# Patient Record
Sex: MALE | Race: WHITE | NOT HISPANIC OR LATINO
[De-identification: names, ages, dates, MRNs, and addresses within clinical notes are randomized per-mention and may not be internally consistent; named-entity substitution may affect disease eponyms.]

---

## 2017-12-13 ENCOUNTER — APPOINTMENT (OUTPATIENT)
Dept: ORTHOPEDIC SURGERY | Facility: CLINIC | Age: 52
End: 2017-12-13
Payer: COMMERCIAL

## 2017-12-13 VITALS
SYSTOLIC BLOOD PRESSURE: 133 MMHG | WEIGHT: 192 LBS | HEART RATE: 79 BPM | DIASTOLIC BLOOD PRESSURE: 86 MMHG | BODY MASS INDEX: 30.13 KG/M2 | HEIGHT: 67 IN

## 2017-12-13 PROCEDURE — 20610 DRAIN/INJ JOINT/BURSA W/O US: CPT | Mod: LT

## 2017-12-13 PROCEDURE — 99214 OFFICE O/P EST MOD 30 MIN: CPT | Mod: 25

## 2017-12-13 PROCEDURE — 73564 X-RAY EXAM KNEE 4 OR MORE: CPT | Mod: LT

## 2017-12-13 RX ADMIN — LIDOCAINE HYDROCHLORIDE 3 %: 10 INJECTION, SOLUTION EPIDURAL; INFILTRATION; INTRACAUDAL; PERINEURAL at 00:00

## 2017-12-13 RX ADMIN — METHYLPREDNISOLONE ACETATE 2 MG/ML: 40 INJECTION, SUSPENSION INTRALESIONAL; INTRAMUSCULAR; INTRASYNOVIAL; SOFT TISSUE at 00:00

## 2017-12-19 RX ORDER — LIDOCAINE HYDROCHLORIDE 10 MG/ML
1 INJECTION, SOLUTION INFILTRATION; PERINEURAL
Refills: 0 | Status: COMPLETED | OUTPATIENT
Start: 2017-12-13

## 2017-12-19 RX ORDER — METHYLPRED ACET/NACL,ISO-OS/PF 40 MG/ML
40 VIAL (ML) INJECTION
Qty: 1 | Refills: 0 | Status: COMPLETED | OUTPATIENT
Start: 2017-12-13

## 2018-12-27 ENCOUNTER — APPOINTMENT (OUTPATIENT)
Dept: INTERNAL MEDICINE | Facility: CLINIC | Age: 53
End: 2018-12-27
Payer: COMMERCIAL

## 2018-12-27 ENCOUNTER — NON-APPOINTMENT (OUTPATIENT)
Age: 53
End: 2018-12-27

## 2018-12-27 ENCOUNTER — LABORATORY RESULT (OUTPATIENT)
Age: 53
End: 2018-12-27

## 2018-12-27 VITALS
HEIGHT: 66 IN | HEART RATE: 98 BPM | WEIGHT: 189 LBS | BODY MASS INDEX: 30.37 KG/M2 | DIASTOLIC BLOOD PRESSURE: 81 MMHG | SYSTOLIC BLOOD PRESSURE: 153 MMHG

## 2018-12-27 VITALS — SYSTOLIC BLOOD PRESSURE: 128 MMHG | DIASTOLIC BLOOD PRESSURE: 82 MMHG

## 2018-12-27 DIAGNOSIS — M50.20 OTHER CERVICAL DISC DISPLACEMENT, UNSPECIFIED CERVICAL REGION: ICD-10-CM

## 2018-12-27 DIAGNOSIS — Z80.1 FAMILY HISTORY OF MALIGNANT NEOPLASM OF TRACHEA, BRONCHUS AND LUNG: ICD-10-CM

## 2018-12-27 PROCEDURE — 99386 PREV VISIT NEW AGE 40-64: CPT | Mod: 25

## 2018-12-27 PROCEDURE — 93000 ELECTROCARDIOGRAM COMPLETE: CPT

## 2018-12-27 PROCEDURE — 81003 URINALYSIS AUTO W/O SCOPE: CPT | Mod: NC,QW

## 2018-12-27 PROCEDURE — 36415 COLL VENOUS BLD VENIPUNCTURE: CPT

## 2018-12-28 LAB
25(OH)D3 SERPL-MCNC: 27.4 NG/ML
ALBUMIN SERPL ELPH-MCNC: 4.6 G/DL
ALP BLD-CCNC: 45 U/L
ALT SERPL-CCNC: 24 U/L
ANION GAP SERPL CALC-SCNC: 12 MMOL/L
AST SERPL-CCNC: 23 U/L
BASOPHILS # BLD AUTO: 0.01 K/UL
BASOPHILS NFR BLD AUTO: 0.1 %
BILIRUB SERPL-MCNC: <0.2 MG/DL
BILIRUB UR QL STRIP: NORMAL
BUN SERPL-MCNC: 22 MG/DL
CALCIUM SERPL-MCNC: 9.9 MG/DL
CHLORIDE SERPL-SCNC: 107 MMOL/L
CHOLEST SERPL-MCNC: 226 MG/DL
CHOLEST/HDLC SERPL: 5.3 RATIO
CLARITY UR: CLEAR
CO2 SERPL-SCNC: 26 MMOL/L
COLLECTION METHOD: NORMAL
CREAT SERPL-MCNC: 0.94 MG/DL
EOSINOPHIL # BLD AUTO: 0.14 K/UL
EOSINOPHIL NFR BLD AUTO: 1.7 %
GLUCOSE SERPL-MCNC: 104 MG/DL
GLUCOSE UR-MCNC: NORMAL
HBA1C MFR BLD HPLC: 5.6 %
HCG UR QL: 0.2 EU/DL
HCT VFR BLD CALC: 44.9 %
HDLC SERPL-MCNC: 43 MG/DL
HGB BLD-MCNC: 14.3 G/DL
HGB UR QL STRIP.AUTO: NORMAL
IMM GRANULOCYTES NFR BLD AUTO: 0.2 %
KETONES UR-MCNC: NORMAL
LDLC SERPL CALC-MCNC: 138 MG/DL
LEUKOCYTE ESTERASE UR QL STRIP: NORMAL
LYMPHOCYTES # BLD AUTO: 3 K/UL
LYMPHOCYTES NFR BLD AUTO: 35.5 %
MAN DIFF?: NORMAL
MCHC RBC-ENTMCNC: 31.1 PG
MCHC RBC-ENTMCNC: 31.8 GM/DL
MCV RBC AUTO: 97.6 FL
MONOCYTES # BLD AUTO: 0.46 K/UL
MONOCYTES NFR BLD AUTO: 5.4 %
NEUTROPHILS # BLD AUTO: 4.82 K/UL
NEUTROPHILS NFR BLD AUTO: 57.1 %
NITRITE UR QL STRIP: NORMAL
PH UR STRIP: 5.5
PLATELET # BLD AUTO: 237 K/UL
POTASSIUM SERPL-SCNC: 5.4 MMOL/L
PROT SERPL-MCNC: 7.1 G/DL
PROT UR STRIP-MCNC: NORMAL
PSA SERPL-MCNC: 10.93 NG/ML
RBC # BLD: 4.6 M/UL
RBC # FLD: 13.6 %
SAVE SPECIMEN: NORMAL
SODIUM SERPL-SCNC: 145 MMOL/L
SP GR UR STRIP: 1.02
T3FREE SERPL-MCNC: 3.37 PG/ML
T4 SERPL-MCNC: 6.4 UG/DL
TRIGL SERPL-MCNC: 223 MG/DL
TSH SERPL-ACNC: 1.15 UIU/ML
URATE SERPL-MCNC: 5.5 MG/DL
WBC # FLD AUTO: 8.45 K/UL

## 2018-12-28 NOTE — PHYSICAL EXAM

## 2018-12-28 NOTE — ASSESSMENT
[FreeTextEntry1] : Hx of a cervical herniated disk s/p spinal fusion, no current deficits.  \par Question of paroxysmal atrial fibrillation.  Was witnessed just once and never again.PAtient has been on diltiazem ever since. EKG NSR now.  EIther the diltiazem is suppressing it or really has not happened. Recommended he return for cardiology follow up ask about a halter or event monitor or a loop monitor.  Continue diltiazem for now.  \par \par MIld BPH symptoms, patient has had a biopsy in the past for a PSA of 10, was found to be benign last year by biopsy.  He remains mildly sympatric and is on supplements, informed him that I doubt that the supplements would work to well but its harmless to give it a try.  HE is following with a urologist.  \par \par \par Check CBC, CMP, HgA1c, Lipid profile, TSH, Vitamin D, UA

## 2018-12-28 NOTE — HISTORY OF PRESENT ILLNESS
[de-identified] : This is a 53 year old Patient previously seeing Dr. Holman 15 year ago.  Dr. Bean previous PCP since then until now.  \par Patient has been diagnosed with Afib years ago during a procedure. It was witnessed once and never seen again 120mg of diltiazem and aspirin.  NO anticoagulation. There is some doubts that he really has it or not.  IT was never treated with anticoagulation or ablation and as far as he knows its never happened since then, though he does describe a funny heart beat for a few seconds once in a while.  Once or twice a year. \par \par He is on a Urinazinc for the prostate, does have 1-2 episodes of nocturia a night ,and some frequency in the day but not too bad, unclear if that’s helping. He was on superbetaprostate at one point but it was expensive and did not help.  \par Cardiologist Dr. Bowling at [facility].  \par \par PSA 10 had biopsy 3 yeares ago found no cancer.  \par Disc surgery\par Hip replacemet 2/12/12.  \par Appendectomy 2011.\par \par Using the patch can tolerate not smoking for a while.  \par \par Colonoscopy 2016 normal. \par \par Left knee arhtirtis for a long itme seeing Dr. Waite.  COnsidering knee replacement. Right knee does bother him.   AChes while driving.  \par

## 2019-04-25 ENCOUNTER — APPOINTMENT (OUTPATIENT)
Dept: INTERNAL MEDICINE | Facility: CLINIC | Age: 54
End: 2019-04-25
Payer: COMMERCIAL

## 2019-04-25 VITALS
SYSTOLIC BLOOD PRESSURE: 115 MMHG | HEART RATE: 75 BPM | WEIGHT: 178 LBS | HEIGHT: 66 IN | DIASTOLIC BLOOD PRESSURE: 70 MMHG | BODY MASS INDEX: 28.61 KG/M2

## 2019-04-25 DIAGNOSIS — M17.12 UNILATERAL PRIMARY OSTEOARTHRITIS, LEFT KNEE: ICD-10-CM

## 2019-04-25 LAB — SAVE SPECIMEN: NORMAL

## 2019-04-25 PROCEDURE — 36415 COLL VENOUS BLD VENIPUNCTURE: CPT

## 2019-04-25 PROCEDURE — 99406 BEHAV CHNG SMOKING 3-10 MIN: CPT

## 2019-04-25 PROCEDURE — 99213 OFFICE O/P EST LOW 20 MIN: CPT | Mod: 25

## 2019-04-25 NOTE — COUNSELING
[Weight management counseling provided] : Weight management [Healthy eating counseling provided] : healthy eating [Activity counseling provided] : activity [Tobacco Use Cessation Intermediate Greater Than 3 Minutes Up to 10 Minutes] : Tobacco Use Cessation Intermediate Greater Than 3 Minutes Up to 10 Minutes

## 2019-04-25 NOTE — PLAN
[FreeTextEntry1] : \par \par PAF on ASA 325mg/CCB, with intermittent palpitations and "skipped beats"\par -has appt with cardiologist in 3 weeks, \par -discussed possibility of Holter monitor vs loop recorder \par \par Left knee pain, 2/2 to underlying severe degenerative dz, \par -f/u orthopedics for possible replacement \par \par Overweight \par -Educated of the importance of Healthy diet, such as Mediterranean Diet and Exercise, such as walking >20 minutes a day and increasing gradually as tolerated\par -Educated on the importance of limiting alcohol use to less than 5 drinks per week and refraining from tobacco and drug use.\par \par Hyperlipidemia \par -Will check labs \par -Advised decrease greasy, fatty foods, increase exercise, fiber intake \par -Elevated cholesterol has been linked to increase in cardiovascular even such as heart attack, stroke, peripheral artery disease and death\par \par Nicotine Dependence\par -discussed multiple options to quit smoking such as nicotine replacements, medications and group therapy, advised to pick a quit date and strive to achieve complete cessation, informational phone numbers, programs and educational materiel given to patient, stressed importance to quit as to reduce risk of cancer, cardiovascular event and premature death.  \par -Patient is a smoker, smoking cessation was strongly encouraged.  Patient was advised that smoking cessation lowers risks for lung and other types of cancer, reduces the risk of coronary heart disease, stroke and peripheral vascular disease and reduces the risk of developing chronic obstructive pulmonary disease (COPD).  Overall, smoking can cause serious health issues and death.  \par -It is important that you stop smoking.  Advised to pick a quit date and adhere to therapy.  If you need help to quit smoking call the [facility] Smokers Quitline at 1-920.287.2426 for additional information and nicotine replacement therapy   time spent on education 5 minutes.\par \par \par follow-up in 6 months \par \par

## 2019-04-25 NOTE — HISTORY OF PRESENT ILLNESS
[de-identified] : 54 y/o male with h/o Paroxysmal afib on asa 325mg/ccb, osteoarthritis and hyperlipidemia presents for follow-up \par \par states overall is feeling well, has intentionally lost weight ~10lbs through diet.  \par \par h/o osteoarthritis, mainly in left knee causing pain daily.  better after ibuprofen in the am.  noted to have severe degenerative disease.  works as a AC  involving climbing stairs daily. s/p steroid injections without much benefit \par \par h/o PAF, states was seen after having his hip operation.  ecgs after then have been NSR.  sees cardiologist with [facility].  states occasionally feels skipped beats and palpitations, briefly that resolve on their own.  \par \par still smoking about 4-5 cigs AD, as compared to 1PPD.

## 2019-04-25 NOTE — PLAN
[FreeTextEntry1] : \par \par PAF on ASA 325mg/CCB, with intermittent palpitations and "skipped beats"\par -has appt with cardiologist in 3 weeks, \par -discussed possibility of Holter monitor vs loop recorder \par \par Left knee pain, 2/2 to underlying severe degenerative dz, \par -f/u orthopedics for possible replacement \par \par Overweight \par -Educated of the importance of Healthy diet, such as Mediterranean Diet and Exercise, such as walking >20 minutes a day and increasing gradually as tolerated\par -Educated on the importance of limiting alcohol use to less than 5 drinks per week and refraining from tobacco and drug use.\par \par Hyperlipidemia \par -Will check labs \par -Advised decrease greasy, fatty foods, increase exercise, fiber intake \par -Elevated cholesterol has been linked to increase in cardiovascular even such as heart attack, stroke, peripheral artery disease and death\par \par Nicotine Dependence\par -discussed multiple options to quit smoking such as nicotine replacements, medications and group therapy, advised to pick a quit date and strive to achieve complete cessation, informational phone numbers, programs and educational materiel given to patient, stressed importance to quit as to reduce risk of cancer, cardiovascular event and premature death.  \par -Patient is a smoker, smoking cessation was strongly encouraged.  Patient was advised that smoking cessation lowers risks for lung and other types of cancer, reduces the risk of coronary heart disease, stroke and peripheral vascular disease and reduces the risk of developing chronic obstructive pulmonary disease (COPD).  Overall, smoking can cause serious health issues and death.  \par -It is important that you stop smoking.  Advised to pick a quit date and adhere to therapy.  If you need help to quit smoking call the [facility] Smokers Quitline at 1-703.743.2452 for additional information and nicotine replacement therapy   time spent on education 5 minutes.\par \par \par follow-up in 6 months \par \par

## 2019-04-26 LAB
25(OH)D3 SERPL-MCNC: 36.3 NG/ML
ALBUMIN SERPL ELPH-MCNC: 4.5 G/DL
ALP BLD-CCNC: 43 U/L
ALT SERPL-CCNC: 18 U/L
ANION GAP SERPL CALC-SCNC: 13 MMOL/L
AST SERPL-CCNC: 22 U/L
BASOPHILS # BLD AUTO: 0.04 K/UL
BASOPHILS NFR BLD AUTO: 0.5 %
BILIRUB DIRECT SERPL-MCNC: 0.1 MG/DL
BILIRUB INDIRECT SERPL-MCNC: 0.2 MG/DL
BILIRUB SERPL-MCNC: 0.3 MG/DL
BUN SERPL-MCNC: 16 MG/DL
CALCIUM SERPL-MCNC: 9.9 MG/DL
CHLORIDE SERPL-SCNC: 104 MMOL/L
CHOLEST SERPL-MCNC: 195 MG/DL
CHOLEST/HDLC SERPL: 4.4 RATIO
CO2 SERPL-SCNC: 24 MMOL/L
CREAT SERPL-MCNC: 0.82 MG/DL
EOSINOPHIL # BLD AUTO: 0.13 K/UL
EOSINOPHIL NFR BLD AUTO: 1.8 %
ESTIMATED AVERAGE GLUCOSE: 111 MG/DL
FOLATE SERPL-MCNC: >20 NG/ML
GLUCOSE SERPL-MCNC: 94 MG/DL
HBA1C MFR BLD HPLC: 5.5 %
HCT VFR BLD CALC: 44.6 %
HDLC SERPL-MCNC: 44 MG/DL
HGB BLD-MCNC: 14.8 G/DL
IMM GRANULOCYTES NFR BLD AUTO: 0.3 %
LDLC SERPL CALC-MCNC: 135 MG/DL
LYMPHOCYTES # BLD AUTO: 2.74 K/UL
LYMPHOCYTES NFR BLD AUTO: 37.2 %
MAN DIFF?: NORMAL
MCHC RBC-ENTMCNC: 31.4 PG
MCHC RBC-ENTMCNC: 33.2 GM/DL
MCV RBC AUTO: 94.7 FL
MONOCYTES # BLD AUTO: 0.49 K/UL
MONOCYTES NFR BLD AUTO: 6.7 %
NEUTROPHILS # BLD AUTO: 3.94 K/UL
NEUTROPHILS NFR BLD AUTO: 53.5 %
PLATELET # BLD AUTO: 223 K/UL
POTASSIUM SERPL-SCNC: 5 MMOL/L
PROT SERPL-MCNC: 7.4 G/DL
RBC # BLD: 4.71 M/UL
RBC # FLD: 12.4 %
SODIUM SERPL-SCNC: 141 MMOL/L
T4 SERPL-MCNC: 6 UG/DL
TRIGL SERPL-MCNC: 80 MG/DL
TSH SERPL-ACNC: 0.88 UIU/ML
VIT B12 SERPL-MCNC: 756 PG/ML
WBC # FLD AUTO: 7.36 K/UL

## 2019-06-25 ENCOUNTER — APPOINTMENT (OUTPATIENT)
Dept: ORTHOPEDIC SURGERY | Facility: CLINIC | Age: 54
End: 2019-06-25
Payer: COMMERCIAL

## 2019-06-25 VITALS
WEIGHT: 178 LBS | HEART RATE: 73 BPM | BODY MASS INDEX: 27.94 KG/M2 | SYSTOLIC BLOOD PRESSURE: 107 MMHG | HEIGHT: 67 IN | DIASTOLIC BLOOD PRESSURE: 71 MMHG

## 2019-06-25 DIAGNOSIS — M17.0 BILATERAL PRIMARY OSTEOARTHRITIS OF KNEE: ICD-10-CM

## 2019-06-25 PROCEDURE — 73562 X-RAY EXAM OF KNEE 3: CPT | Mod: RT

## 2019-06-25 PROCEDURE — 72170 X-RAY EXAM OF PELVIS: CPT | Mod: 59

## 2019-06-25 PROCEDURE — 99214 OFFICE O/P EST MOD 30 MIN: CPT

## 2019-06-25 NOTE — HISTORY OF PRESENT ILLNESS
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 The left being worse than the right he saw Dr. Austin for a cortisone injection, which worked for a month or so. He is having increasing pain in his line of work, climbing ladders and jumping walls etc. his use nonsteroidals in the past.\par Pertinent medical history is the fact that he had a right total hip replacement done with a significant leg length difference the right leg being longer by about 7/8 of an inch. [de-identified] : minimal reli nonsteroidals

## 2019-06-25 NOTE — DISCUSSION/SUMMARY
[Surgical risks reviewed] : Surgical risks reviewed [Medication Risks Reviewed] : Medication risks reviewed [de-identified] : The patient was seen and evaluated today with Dr. Gil the patient has failed anti-inflammatory therapy. He is quite physically active and participates in gym exercises. He has had cortisone in the past at the present time, Dr. Gil, favors the patient trying any viscose supplementation. Trial to see if this can temporize his discomfort and failing that the patient would be recommended for a total knee replacement of the left knee.\par Dr. Gil evaluated this patient and will be guiding this patient's entire orthopedic management plan. The patient was advised that based upon their clinical presentation and radiographic findings they meet inclusion criteria for benefitting from a left total knee arthroplasty as a treatment option for severe arthritis of the knee.\par The spectrum of treatments for severe knee DJD were reviewed in detail. I reviewed in detail the goals, alternatives, risks and benefits of total knee arthroplasty. The patient was demonstrated. A model of the total knee replacement. Advised on the brand and preliminary model of the implant that I use. Patient also understands that the preliminary discharge plan would be for the patient to go home in approximately 3 days unless otherwise not cleared by physical therapy\par \par The patient was advised of the possible complications which are inclusive of but not exclusive to VTE-related, infectious-related, anesthetic-related, surgically-related, medically-related, and implant-related. \par The patient was advised that I operate as a surgical team with his associate Dr. CONCETTA Angulo. The patient agreed to the plan of care, as well as the use of implants for there. Total knee surgery\par The patient was advised that they will require a medical preoperative risk evaluation by their PCP. Further medical subspecialty clearances such as cardiac may be indicated if felt needed by their PCP.\par The patient was advised he/she may call our office after careful consideration of their treatment options and further consultation with any other physician, to begin the process of preoperative planning for elective left TKR at a time of their choosing. \par A long discussion was held with the patient in regards to the total joint replacement. Using a model to demonstrate the procedure. The risks, benefits, and alternatives to surgical intervention were discussed at length with the patient hospitalization and rehabilitation were discussed and the patient was made aware of the prophylaxis with antibiotics and the need for postoperative anticoagulation. Specific risks discussed included infection, wound breakdown, numbness, and damage to nerves, tendons, muscles, arteries and blood vessels. The possibility of recurrent pain, no improvement in pain and actual worsening of pain were also mentioned in conversation with the patient medical complications related to the patient's general medical health, including deep vein thrombosis, pulmonary embolism, heart attack, stroke, death, and other complications from anesthesia or we discussed as well. The patient was told that we will take steps to minimize these risks by using sterile technique antibiotics and DVT prophylaxis. When appropriate and to follow the patient in the clinical setting. Postoperatively to monitor her progress. The benefits of surgery were discussed and the patient was included in the conversation regarding potential for improvement of the current clinical condition. Through operative intervention. Alternatives to surgical intervention, including continued conservative management, which may yield less than optimal results in this particular patient, were discussed. All questions were answered to the satisfaction of the patient the patient will consider scheduling a joint replacement with our surgical booking desk, and we'll contact her office if there are any further questions that we may answer for this particular patient.\par

## 2019-06-25 NOTE — PHYSICAL EXAM
[LE] : 5/5 motor strength in bilateral lower extremities [Antalgic] : antalgic [DP] : dorsalis pedis 2+ and symmetric bilaterally [PT] : posterior tibial 2+ and symmetric bilaterally [Normal LLE] : Left Lower Extremity: No scars, rashes, lesions, ulcers, skin intact [Normal RLE] : Right Lower Extremity: No scars, rashes, lesions, ulcers, skin intact [Normal Touch] : sensation intact for touch [Normal] : Oriented to person, place, and time, insight and judgement were intact and the affect was normal [de-identified] : Slight antalgic gait, apparently, favoring the left knee. The patient is wearing 7/8 lift on his left lower extremity shoe to make up for the length and right hip.\par On examination, right hip shows some stiffness with only 20° of external rotation at 90° of flexion 20°, abduction of the right hip is also a similar stiff with limitation of 20° of external rotation 10° of internal rotation about 20-25° of abduction. Armando examination bilateral is positive at about 45°.\par Examining both knees. There is a prominent Baker's cyst behind the right knee grade 3/5  4/5 overpull of the left knee. Range of motion of both knees is from near full extension, flexing to 110°. Neurovascular status intact in lower extremities good strength against resistance at EHL and dorsiflexion. No extensor lag. No anterior drawer bilaterally [de-identified] : AP pelvis, shows a right total hip replacement in apparent good repair is significant leg length difference with the right leg being longer than the left measured across the lesser trochanter.\par 3 views of both knees show extensive DJD of the left knee with bone-on-bone wear along the medial joint line with the significant debris in the patellofemoral articulation and debris in the medial joint line of the left knee. The right knee also shows some significant wear of the medial joint line. Both tibias are beginning to sublux laterally under the femur. Grade 1

## 2019-07-31 ENCOUNTER — OUTPATIENT (OUTPATIENT)
Dept: OUTPATIENT SERVICES | Facility: HOSPITAL | Age: 54
LOS: 1 days | End: 2019-07-31
Payer: COMMERCIAL

## 2019-07-31 VITALS
SYSTOLIC BLOOD PRESSURE: 109 MMHG | DIASTOLIC BLOOD PRESSURE: 69 MMHG | WEIGHT: 180.78 LBS | RESPIRATION RATE: 16 BRPM | OXYGEN SATURATION: 96 % | HEART RATE: 75 BPM | TEMPERATURE: 99 F | HEIGHT: 67.25 IN

## 2019-07-31 DIAGNOSIS — M17.12 UNILATERAL PRIMARY OSTEOARTHRITIS, LEFT KNEE: ICD-10-CM

## 2019-07-31 DIAGNOSIS — Z96.60 PRESENCE OF UNSPECIFIED ORTHOPEDIC JOINT IMPLANT: Chronic | ICD-10-CM

## 2019-07-31 DIAGNOSIS — Z98.890 OTHER SPECIFIED POSTPROCEDURAL STATES: Chronic | ICD-10-CM

## 2019-07-31 DIAGNOSIS — Z86.79 PERSONAL HISTORY OF OTHER DISEASES OF THE CIRCULATORY SYSTEM: ICD-10-CM

## 2019-07-31 DIAGNOSIS — Z98.89 OTHER SPECIFIED POSTPROCEDURAL STATES: Chronic | ICD-10-CM

## 2019-07-31 DIAGNOSIS — Z01.818 ENCOUNTER FOR OTHER PREPROCEDURAL EXAMINATION: ICD-10-CM

## 2019-07-31 LAB
ALBUMIN SERPL ELPH-MCNC: 4 G/DL — SIGNIFICANT CHANGE UP (ref 3.3–5)
ALP SERPL-CCNC: 49 U/L — SIGNIFICANT CHANGE UP (ref 30–120)
ALT FLD-CCNC: 27 U/L DA — SIGNIFICANT CHANGE UP (ref 10–60)
ANION GAP SERPL CALC-SCNC: 5 MMOL/L — SIGNIFICANT CHANGE UP (ref 5–17)
APTT BLD: 30.9 SEC — SIGNIFICANT CHANGE UP (ref 28.5–37)
AST SERPL-CCNC: 18 U/L — SIGNIFICANT CHANGE UP (ref 10–40)
BILIRUB SERPL-MCNC: 0.3 MG/DL — SIGNIFICANT CHANGE UP (ref 0.2–1.2)
BLD GP AB SCN SERPL QL: SIGNIFICANT CHANGE UP
BUN SERPL-MCNC: 16 MG/DL — SIGNIFICANT CHANGE UP (ref 7–23)
CALCIUM SERPL-MCNC: 9.2 MG/DL — SIGNIFICANT CHANGE UP (ref 8.4–10.5)
CHLORIDE SERPL-SCNC: 106 MMOL/L — SIGNIFICANT CHANGE UP (ref 96–108)
CO2 SERPL-SCNC: 29 MMOL/L — SIGNIFICANT CHANGE UP (ref 22–31)
CREAT SERPL-MCNC: 0.99 MG/DL — SIGNIFICANT CHANGE UP (ref 0.5–1.3)
GLUCOSE SERPL-MCNC: 95 MG/DL — SIGNIFICANT CHANGE UP (ref 70–99)
HCT VFR BLD CALC: 44.8 % — SIGNIFICANT CHANGE UP (ref 39–50)
HGB BLD-MCNC: 15.1 G/DL — SIGNIFICANT CHANGE UP (ref 13–17)
INR BLD: 1.02 RATIO — SIGNIFICANT CHANGE UP (ref 0.88–1.16)
MCHC RBC-ENTMCNC: 32.2 PG — SIGNIFICANT CHANGE UP (ref 27–34)
MCHC RBC-ENTMCNC: 33.7 GM/DL — SIGNIFICANT CHANGE UP (ref 32–36)
MCV RBC AUTO: 95.5 FL — SIGNIFICANT CHANGE UP (ref 80–100)
MRSA PCR RESULT.: SIGNIFICANT CHANGE UP
NRBC # BLD: 0 /100 WBCS — SIGNIFICANT CHANGE UP (ref 0–0)
PLATELET # BLD AUTO: 197 K/UL — SIGNIFICANT CHANGE UP (ref 150–400)
POTASSIUM SERPL-MCNC: 5.1 MMOL/L — SIGNIFICANT CHANGE UP (ref 3.5–5.3)
POTASSIUM SERPL-SCNC: 5.1 MMOL/L — SIGNIFICANT CHANGE UP (ref 3.5–5.3)
PROT SERPL-MCNC: 7.4 G/DL — SIGNIFICANT CHANGE UP (ref 6–8.3)
PROTHROM AB SERPL-ACNC: 11.1 SEC — SIGNIFICANT CHANGE UP (ref 10–12.9)
RBC # BLD: 4.69 M/UL — SIGNIFICANT CHANGE UP (ref 4.2–5.8)
RBC # FLD: 12.5 % — SIGNIFICANT CHANGE UP (ref 10.3–14.5)
S AUREUS DNA NOSE QL NAA+PROBE: DETECTED
SODIUM SERPL-SCNC: 140 MMOL/L — SIGNIFICANT CHANGE UP (ref 135–145)
WBC # BLD: 6.53 K/UL — SIGNIFICANT CHANGE UP (ref 3.8–10.5)
WBC # FLD AUTO: 6.53 K/UL — SIGNIFICANT CHANGE UP (ref 3.8–10.5)

## 2019-07-31 PROCEDURE — 85027 COMPLETE CBC AUTOMATED: CPT

## 2019-07-31 PROCEDURE — 87641 MR-STAPH DNA AMP PROBE: CPT

## 2019-07-31 PROCEDURE — 85610 PROTHROMBIN TIME: CPT

## 2019-07-31 PROCEDURE — 80053 COMPREHEN METABOLIC PANEL: CPT

## 2019-07-31 PROCEDURE — 93005 ELECTROCARDIOGRAM TRACING: CPT

## 2019-07-31 PROCEDURE — 85730 THROMBOPLASTIN TIME PARTIAL: CPT

## 2019-07-31 PROCEDURE — 86850 RBC ANTIBODY SCREEN: CPT

## 2019-07-31 PROCEDURE — 86901 BLOOD TYPING SEROLOGIC RH(D): CPT

## 2019-07-31 PROCEDURE — 86900 BLOOD TYPING SEROLOGIC ABO: CPT

## 2019-07-31 PROCEDURE — 93010 ELECTROCARDIOGRAM REPORT: CPT

## 2019-07-31 PROCEDURE — 87640 STAPH A DNA AMP PROBE: CPT

## 2019-07-31 PROCEDURE — G0463: CPT

## 2019-07-31 PROCEDURE — 36415 COLL VENOUS BLD VENIPUNCTURE: CPT

## 2019-07-31 NOTE — H&P PST ADULT - HISTORY OF PRESENT ILLNESS
54 yo male presents with 2 year history of left knee pain. Received cortisone injections in the past with minimal relief. Pain currently 0-2/10 at rest and 5/10 with daily activity and 8-9/10 with prolonged activity. Pain relieved with ibuprofen.

## 2019-07-31 NOTE — H&P PST ADULT - NSICDXPASTMEDICALHX_GEN_ALL_CORE_FT
PAST MEDICAL HISTORY:  Atrial fibrillation 2012, after THR and no recurrence since    BPH (benign prostatic hyperplasia)     Cervical herniated disc     Constipation     Essential hypertension     History of osteoarthritis     Lumbar pain     Nocturia x 2-3    Palpitations on occasion    Primary osteoarthritis of left knee     Snoring wears an oral appliance which improved snoring, no witnessed apneas    Spinal arthritis     Urinary hesitancy     Varicose veins left leg including left knee

## 2019-07-31 NOTE — H&P PST ADULT - NSICDXPROBLEM_GEN_ALL_CORE_FT
PROBLEM DIAGNOSES  Problem: Primary osteoarthritis of left knee  Assessment and Plan: left TKR. medical clearance requested. instructed to check with PCP about stopping aspirin or changing to low dose. stop urinozinc and ibuprofen 1 week prior to surgery. surgical wash instructions reviewed and verbalized understanding PROBLEM DIAGNOSES  Problem: History of varicose veins  Assessment and Plan: Called Concha Walton and notified her of large varicose veins on left knee and distal to the knee. Patient to have vascular consultation with Dr. Reynolds.    Problem: Primary osteoarthritis of left knee  Assessment and Plan: left TKR. medical clearance requested. instructed to check with PCP about stopping aspirin or changing to low dose. stop urinozinc and ibuprofen 1 week prior to surgery. surgical wash instructions reviewed and verbalized understanding

## 2019-07-31 NOTE — H&P PST ADULT - NSICDXFAMILYHX_GEN_ALL_CORE_FT
FAMILY HISTORY:  Father  Still living? No  FHx: lung cancer, Age at diagnosis: Age Unknown    Sibling  Still living? Yes, Estimated age: 51-60  Family history of osteoarthritis, Age at diagnosis: Age Unknown    Grandparent  Still living? No  Family history of osteoarthritis, Age at diagnosis: Age Unknown

## 2019-07-31 NOTE — H&P PST ADULT - NSANTHOSAYNRD_GEN_A_CORE
No. MUNA screening performed.  STOP BANG Legend: 0-2 = LOW Risk; 3-4 = INTERMEDIATE Risk; 5-8 = HIGH Risk/neck neck 15 inches/No. MUNA screening performed.  STOP BANG Legend: 0-2 = LOW Risk; 3-4 = INTERMEDIATE Risk; 5-8 = HIGH Risk

## 2019-07-31 NOTE — H&P PST ADULT - RS GEN PE MLT RESP DETAILS PC
clear to auscultation bilaterally/good air movement/breath sounds equal/no rales/no wheezes/airway patent/respirations non-labored/no rhonchi

## 2019-07-31 NOTE — H&P PST ADULT - NSICDXPASTSURGICALHX_GEN_ALL_CORE_FT
PAST SURGICAL HISTORY:  H/O cervical spine surgery cervical laminectomy 2012, unsure levels    S/P appendectomy 2009    S/P hip replacement right 2013

## 2019-07-31 NOTE — H&P PST ADULT - MUSCULOSKELETAL
details… detailed exam no joint warmth/decreased ROM/no calf tenderness/no joint erythema/diminished strength/left knee/decreased ROM due to pain/no joint swelling

## 2019-08-01 RX ORDER — MUPIROCIN 20 MG/G
1 OINTMENT TOPICAL
Qty: 1 | Refills: 0
Start: 2019-08-01 | End: 2019-08-05

## 2019-08-01 NOTE — PROGRESS NOTE ADULT - SUBJECTIVE AND OBJECTIVE BOX
Nasal culture + for MSSA.  Rx for Mupirocin e-scribed to pharmacy.  Message left on answering machine requesting a call back.  SS Nasal culture + for MSSA.  Rx for Mupirocin e-scribed to pharmacy.  Message left on answering machine requesting a call back.  SS    8/2-  Spoke with patient.  Use of Mupirocin reviewed with patient and understood. SS

## 2019-08-05 ENCOUNTER — APPOINTMENT (OUTPATIENT)
Dept: INTERNAL MEDICINE | Facility: CLINIC | Age: 54
End: 2019-08-05
Payer: COMMERCIAL

## 2019-08-05 VITALS
DIASTOLIC BLOOD PRESSURE: 74 MMHG | RESPIRATION RATE: 16 BRPM | SYSTOLIC BLOOD PRESSURE: 118 MMHG | HEIGHT: 67 IN | BODY MASS INDEX: 28.56 KG/M2 | OXYGEN SATURATION: 98 % | WEIGHT: 182 LBS | HEART RATE: 76 BPM

## 2019-08-05 PROCEDURE — 99214 OFFICE O/P EST MOD 30 MIN: CPT

## 2019-08-06 NOTE — ASSESSMENT
[High Risk Surgery - Intraperitoneal, Intrathoracic or Supringuinal Vascular Procedures] : High Risk Surgery - Intraperitoneal, Intrathoracic or Supringuinal Vascular Procedures - No (0) [Ischemic Heart Disease] : Ischemic Heart Disease - No (0) [Congestive Heart Failure] : Congestive Heart Failure - No (0) [Creatinine >= 2mg/dL (1 Point)] : Creatinine >= 2mg/dL - No (0) [Prior Cerebrovascular Accident or TIA] : Prior Cerebrovascular Accident or TIA - No (0) [Insulin-dependent Diabetic (1 Point)] : Insulin-dependent Diabetic - No (0) [0] : 0 , RCRI Class: I, Risk of Post-Op Cardiac Complications: 0.4%, Procedure Risk: Low-Risk [No Further Testing Recommended] : no further testing recommended [Patient Optimized for Surgery] : Patient optimized for surgery [Modify anti-platelet treatment prior to procedure] : Modify anti-platelet treatment prior to procedure [As per surgery] : as per surgery [FreeTextEntry4] : 54 y/o male smoker with h/o PAF on asa 325mg, last episode on ecg was several years ago.  \par patient is a moderate risk candidate undergoing moderate risk procedure.\par \par patient is optimized and cleared for surgery  [FreeTextEntry6] : stop 1 week prior to surgery

## 2019-08-06 NOTE — HISTORY OF PRESENT ILLNESS
[Smoker] : smoker [(Patient denies any chest pain, claudication, dyspnea on exertion, orthopnea, palpitations or syncope)] : Patient denies any chest pain, claudication, dyspnea on exertion, orthopnea, palpitations or syncope [Chronic Anticoagulation] : no chronic anticoagulation [Diabetes] : no diabetes [Chronic Kidney Disease] : no chronic kidney disease [Good (7-10 METs)] : Good (7-10 METs) [FreeTextEntry1] : Left Knee Replacement  [FreeTextEntry2] : 08/19/2019 [FreeTextEntry3] : Dr. Luis Carlos Gil [FreeTextEntry4] : went to Pre-surgical testing last week. labs and ecg reviewed \par +smoker, 3-4x cigs\par

## 2019-08-19 ENCOUNTER — RESULT REVIEW (OUTPATIENT)
Age: 54
End: 2019-08-19

## 2019-08-19 ENCOUNTER — INPATIENT (INPATIENT)
Facility: HOSPITAL | Age: 54
LOS: 1 days | Discharge: HOME | DRG: 470 | End: 2019-08-21
Attending: ORTHOPAEDIC SURGERY | Admitting: ORTHOPAEDIC SURGERY
Payer: COMMERCIAL

## 2019-08-19 ENCOUNTER — APPOINTMENT (OUTPATIENT)
Dept: ORTHOPEDIC SURGERY | Facility: HOSPITAL | Age: 54
End: 2019-08-19

## 2019-08-19 VITALS
OXYGEN SATURATION: 98 % | TEMPERATURE: 98 F | HEART RATE: 76 BPM | DIASTOLIC BLOOD PRESSURE: 73 MMHG | HEIGHT: 67 IN | WEIGHT: 178.79 LBS | SYSTOLIC BLOOD PRESSURE: 119 MMHG | RESPIRATION RATE: 15 BRPM

## 2019-08-19 DIAGNOSIS — G47.33 OBSTRUCTIVE SLEEP APNEA (ADULT) (PEDIATRIC): ICD-10-CM

## 2019-08-19 DIAGNOSIS — M17.12 UNILATERAL PRIMARY OSTEOARTHRITIS, LEFT KNEE: ICD-10-CM

## 2019-08-19 DIAGNOSIS — Z98.890 OTHER SPECIFIED POSTPROCEDURAL STATES: Chronic | ICD-10-CM

## 2019-08-19 DIAGNOSIS — Z72.0 TOBACCO USE: ICD-10-CM

## 2019-08-19 DIAGNOSIS — I10 ESSENTIAL (PRIMARY) HYPERTENSION: ICD-10-CM

## 2019-08-19 DIAGNOSIS — Z96.652 PRESENCE OF LEFT ARTIFICIAL KNEE JOINT: ICD-10-CM

## 2019-08-19 DIAGNOSIS — F17.200 NICOTINE DEPENDENCE, UNSPECIFIED, UNCOMPLICATED: ICD-10-CM

## 2019-08-19 DIAGNOSIS — Z98.89 OTHER SPECIFIED POSTPROCEDURAL STATES: Chronic | ICD-10-CM

## 2019-08-19 DIAGNOSIS — Z96.60 PRESENCE OF UNSPECIFIED ORTHOPEDIC JOINT IMPLANT: Chronic | ICD-10-CM

## 2019-08-19 DIAGNOSIS — I48.0 PAROXYSMAL ATRIAL FIBRILLATION: ICD-10-CM

## 2019-08-19 PROBLEM — N40.0 BENIGN PROSTATIC HYPERPLASIA WITHOUT LOWER URINARY TRACT SYMPTOMS: Chronic | Status: ACTIVE | Noted: 2019-07-29

## 2019-08-19 PROBLEM — R39.11 HESITANCY OF MICTURITION: Chronic | Status: ACTIVE | Noted: 2019-07-29

## 2019-08-19 PROBLEM — I83.90 ASYMPTOMATIC VARICOSE VEINS OF UNSPECIFIED LOWER EXTREMITY: Chronic | Status: ACTIVE | Noted: 2019-07-31

## 2019-08-19 PROBLEM — I48.91 UNSPECIFIED ATRIAL FIBRILLATION: Chronic | Status: ACTIVE | Noted: 2019-07-29

## 2019-08-19 PROBLEM — R35.1 NOCTURIA: Chronic | Status: ACTIVE | Noted: 2019-07-29

## 2019-08-19 PROBLEM — R00.2 PALPITATIONS: Chronic | Status: ACTIVE | Noted: 2019-07-29

## 2019-08-19 PROBLEM — K59.00 CONSTIPATION, UNSPECIFIED: Chronic | Status: ACTIVE | Noted: 2019-07-31

## 2019-08-19 PROBLEM — R06.83 SNORING: Chronic | Status: ACTIVE | Noted: 2019-07-29

## 2019-08-19 LAB
ANION GAP SERPL CALC-SCNC: 7 MMOL/L — SIGNIFICANT CHANGE UP (ref 5–17)
BUN SERPL-MCNC: 14 MG/DL — SIGNIFICANT CHANGE UP (ref 7–23)
CALCIUM SERPL-MCNC: 8.3 MG/DL — LOW (ref 8.4–10.5)
CHLORIDE SERPL-SCNC: 104 MMOL/L — SIGNIFICANT CHANGE UP (ref 96–108)
CO2 SERPL-SCNC: 26 MMOL/L — SIGNIFICANT CHANGE UP (ref 22–31)
CREAT SERPL-MCNC: 0.98 MG/DL — SIGNIFICANT CHANGE UP (ref 0.5–1.3)
GLUCOSE SERPL-MCNC: 162 MG/DL — HIGH (ref 70–99)
HCT VFR BLD CALC: 40.2 % — SIGNIFICANT CHANGE UP (ref 39–50)
HGB BLD-MCNC: 13.4 G/DL — SIGNIFICANT CHANGE UP (ref 13–17)
POTASSIUM SERPL-MCNC: 4.3 MMOL/L — SIGNIFICANT CHANGE UP (ref 3.5–5.3)
POTASSIUM SERPL-SCNC: 4.3 MMOL/L — SIGNIFICANT CHANGE UP (ref 3.5–5.3)
SODIUM SERPL-SCNC: 137 MMOL/L — SIGNIFICANT CHANGE UP (ref 135–145)

## 2019-08-19 PROCEDURE — 73562 X-RAY EXAM OF KNEE 3: CPT | Mod: 26,LT

## 2019-08-19 PROCEDURE — 99406 BEHAV CHNG SMOKING 3-10 MIN: CPT

## 2019-08-19 PROCEDURE — 27447 TOTAL KNEE ARTHROPLASTY: CPT | Mod: 82,LT

## 2019-08-19 PROCEDURE — 27447 TOTAL KNEE ARTHROPLASTY: CPT | Mod: LT

## 2019-08-19 PROCEDURE — 99223 1ST HOSP IP/OBS HIGH 75: CPT | Mod: 25

## 2019-08-19 PROCEDURE — 88311 DECALCIFY TISSUE: CPT | Mod: 26

## 2019-08-19 PROCEDURE — 88305 TISSUE EXAM BY PATHOLOGIST: CPT | Mod: 26

## 2019-08-19 RX ORDER — ONDANSETRON 8 MG/1
4 TABLET, FILM COATED ORAL EVERY 6 HOURS
Refills: 0 | Status: DISCONTINUED | OUTPATIENT
Start: 2019-08-19 | End: 2019-08-21

## 2019-08-19 RX ORDER — HYDROMORPHONE HYDROCHLORIDE 2 MG/ML
0.5 INJECTION INTRAMUSCULAR; INTRAVENOUS; SUBCUTANEOUS
Refills: 0 | Status: DISCONTINUED | OUTPATIENT
Start: 2019-08-19 | End: 2019-08-19

## 2019-08-19 RX ORDER — CEFAZOLIN SODIUM 1 G
2000 VIAL (EA) INJECTION ONCE
Refills: 0 | Status: COMPLETED | OUTPATIENT
Start: 2019-08-19 | End: 2019-08-19

## 2019-08-19 RX ORDER — CHLORHEXIDINE GLUCONATE 213 G/1000ML
1 SOLUTION TOPICAL ONCE
Refills: 0 | Status: COMPLETED | OUTPATIENT
Start: 2019-08-19 | End: 2019-08-19

## 2019-08-19 RX ORDER — ACETAMINOPHEN 500 MG
1000 TABLET ORAL EVERY 6 HOURS
Refills: 0 | Status: COMPLETED | OUTPATIENT
Start: 2019-08-19 | End: 2019-08-20

## 2019-08-19 RX ORDER — APREPITANT 80 MG/1
40 CAPSULE ORAL ONCE
Refills: 0 | Status: COMPLETED | OUTPATIENT
Start: 2019-08-19 | End: 2019-08-19

## 2019-08-19 RX ORDER — POLYETHYLENE GLYCOL 3350 17 G/17G
17 POWDER, FOR SOLUTION ORAL DAILY
Refills: 0 | Status: DISCONTINUED | OUTPATIENT
Start: 2019-08-19 | End: 2019-08-21

## 2019-08-19 RX ORDER — OXYCODONE HYDROCHLORIDE 5 MG/1
10 TABLET ORAL
Refills: 0 | Status: DISCONTINUED | OUTPATIENT
Start: 2019-08-19 | End: 2019-08-21

## 2019-08-19 RX ORDER — SENNA PLUS 8.6 MG/1
2 TABLET ORAL AT BEDTIME
Refills: 0 | Status: DISCONTINUED | OUTPATIENT
Start: 2019-08-19 | End: 2019-08-21

## 2019-08-19 RX ORDER — HYDROMORPHONE HYDROCHLORIDE 2 MG/ML
0.5 INJECTION INTRAMUSCULAR; INTRAVENOUS; SUBCUTANEOUS
Refills: 0 | Status: DISCONTINUED | OUTPATIENT
Start: 2019-08-19 | End: 2019-08-21

## 2019-08-19 RX ORDER — OXYCODONE HYDROCHLORIDE 5 MG/1
5 TABLET ORAL
Refills: 0 | Status: DISCONTINUED | OUTPATIENT
Start: 2019-08-19 | End: 2019-08-21

## 2019-08-19 RX ORDER — MAGNESIUM HYDROXIDE 400 MG/1
30 TABLET, CHEWABLE ORAL DAILY
Refills: 0 | Status: DISCONTINUED | OUTPATIENT
Start: 2019-08-19 | End: 2019-08-21

## 2019-08-19 RX ORDER — POLYETHYLENE GLYCOL 3350 17 G/17G
1 POWDER, FOR SOLUTION ORAL
Qty: 0 | Refills: 0 | DISCHARGE

## 2019-08-19 RX ORDER — NICOTINE POLACRILEX 2 MG
1 GUM BUCCAL DAILY
Refills: 0 | Status: DISCONTINUED | OUTPATIENT
Start: 2019-08-19 | End: 2019-08-19

## 2019-08-19 RX ORDER — SODIUM CHLORIDE 9 MG/ML
1000 INJECTION, SOLUTION INTRAVENOUS
Refills: 0 | Status: DISCONTINUED | OUTPATIENT
Start: 2019-08-19 | End: 2019-08-19

## 2019-08-19 RX ORDER — NICOTINE POLACRILEX 2 MG
1 GUM BUCCAL DAILY
Refills: 0 | Status: DISCONTINUED | OUTPATIENT
Start: 2019-08-19 | End: 2019-08-21

## 2019-08-19 RX ORDER — DILTIAZEM HCL 120 MG
120 CAPSULE, EXT RELEASE 24 HR ORAL EVERY 24 HOURS
Refills: 0 | Status: DISCONTINUED | OUTPATIENT
Start: 2019-08-19 | End: 2019-08-21

## 2019-08-19 RX ORDER — TRANEXAMIC ACID 100 MG/ML
1000 INJECTION, SOLUTION INTRAVENOUS ONCE
Refills: 0 | Status: COMPLETED | OUTPATIENT
Start: 2019-08-19 | End: 2019-08-19

## 2019-08-19 RX ORDER — ASPIRIN/CALCIUM CARB/MAGNESIUM 324 MG
81 TABLET ORAL
Refills: 0 | Status: DISCONTINUED | OUTPATIENT
Start: 2019-08-20 | End: 2019-08-20

## 2019-08-19 RX ORDER — CEFAZOLIN SODIUM 1 G
2000 VIAL (EA) INJECTION EVERY 8 HOURS
Refills: 0 | Status: COMPLETED | OUTPATIENT
Start: 2019-08-19 | End: 2019-08-19

## 2019-08-19 RX ORDER — ACETAMINOPHEN 500 MG
1000 TABLET ORAL EVERY 8 HOURS
Refills: 0 | Status: DISCONTINUED | OUTPATIENT
Start: 2019-08-20 | End: 2019-08-21

## 2019-08-19 RX ORDER — ONDANSETRON 8 MG/1
4 TABLET, FILM COATED ORAL ONCE
Refills: 0 | Status: DISCONTINUED | OUTPATIENT
Start: 2019-08-19 | End: 2019-08-19

## 2019-08-19 RX ORDER — NICOTINE POLACRILEX 2 MG
1 GUM BUCCAL
Refills: 0 | Status: DISCONTINUED | OUTPATIENT
Start: 2019-08-19 | End: 2019-08-21

## 2019-08-19 RX ORDER — DOCUSATE SODIUM 100 MG
100 CAPSULE ORAL THREE TIMES A DAY
Refills: 0 | Status: DISCONTINUED | OUTPATIENT
Start: 2019-08-19 | End: 2019-08-21

## 2019-08-19 RX ORDER — SODIUM CHLORIDE 9 MG/ML
1000 INJECTION, SOLUTION INTRAVENOUS
Refills: 0 | Status: DISCONTINUED | OUTPATIENT
Start: 2019-08-19 | End: 2019-08-21

## 2019-08-19 RX ORDER — CELECOXIB 200 MG/1
200 CAPSULE ORAL EVERY 12 HOURS
Refills: 0 | Status: DISCONTINUED | OUTPATIENT
Start: 2019-08-19 | End: 2019-08-21

## 2019-08-19 RX ORDER — ACETAMINOPHEN 500 MG
1000 TABLET ORAL ONCE
Refills: 0 | Status: COMPLETED | OUTPATIENT
Start: 2019-08-19 | End: 2019-08-19

## 2019-08-19 RX ADMIN — OXYCODONE HYDROCHLORIDE 10 MILLIGRAM(S): 5 TABLET ORAL at 14:00

## 2019-08-19 RX ADMIN — SODIUM CHLORIDE 100 MILLILITER(S): 9 INJECTION, SOLUTION INTRAVENOUS at 14:00

## 2019-08-19 RX ADMIN — APREPITANT 40 MILLIGRAM(S): 80 CAPSULE ORAL at 06:18

## 2019-08-19 RX ADMIN — CELECOXIB 200 MILLIGRAM(S): 200 CAPSULE ORAL at 22:09

## 2019-08-19 RX ADMIN — Medication 1000 MILLIGRAM(S): at 22:09

## 2019-08-19 RX ADMIN — CHLORHEXIDINE GLUCONATE 1 APPLICATION(S): 213 SOLUTION TOPICAL at 06:18

## 2019-08-19 RX ADMIN — Medication 100 MILLIGRAM(S): at 16:19

## 2019-08-19 RX ADMIN — Medication 1000 MILLIGRAM(S): at 16:00

## 2019-08-19 RX ADMIN — OXYCODONE HYDROCHLORIDE 10 MILLIGRAM(S): 5 TABLET ORAL at 22:29

## 2019-08-19 RX ADMIN — SODIUM CHLORIDE 100 MILLILITER(S): 9 INJECTION, SOLUTION INTRAVENOUS at 21:58

## 2019-08-19 RX ADMIN — OXYCODONE HYDROCHLORIDE 10 MILLIGRAM(S): 5 TABLET ORAL at 15:00

## 2019-08-19 RX ADMIN — Medication 400 MILLIGRAM(S): at 21:59

## 2019-08-19 RX ADMIN — CELECOXIB 200 MILLIGRAM(S): 200 CAPSULE ORAL at 21:59

## 2019-08-19 RX ADMIN — Medication 100 MILLIGRAM(S): at 23:19

## 2019-08-19 RX ADMIN — OXYCODONE HYDROCHLORIDE 10 MILLIGRAM(S): 5 TABLET ORAL at 21:59

## 2019-08-19 RX ADMIN — Medication 120 MILLIGRAM(S): at 21:59

## 2019-08-19 RX ADMIN — Medication 400 MILLIGRAM(S): at 15:30

## 2019-08-19 NOTE — PHYSICAL THERAPY INITIAL EVALUATION ADULT - CRITERIA FOR SKILLED THERAPEUTIC INTERVENTIONS
anticipated discharge recommendation/impairments found/functional limitations in following categories/anticipated equipment needs at discharge

## 2019-08-19 NOTE — CONSULT NOTE ADULT - PROBLEM SELECTOR RECOMMENDATION 9
Pain Management: acceptable- continue current care Tylenol ATC/Celebrex ATC/ Oxycodone PRN  Continue PT/OT  DVT proph: [x  ] low risk - Aspirin   DC plan:  [ x ] Home with HC

## 2019-08-19 NOTE — PHYSICAL THERAPY INITIAL EVALUATION ADULT - ADDITIONAL COMMENTS
Pt plans to stay at his mother's house after discharge.  His mother's house has 1+1 steps to enter with rail.  No steps inside.  Pt has no DME.  Pt reports right LE is longer than left LE.

## 2019-08-19 NOTE — OCCUPATIONAL THERAPY INITIAL EVALUATION ADULT - ADDITIONAL COMMENTS
3-4 JR +railing +flight  with railing
Pt lives with family in a private home with a lot of stairs.    Pt will stay at his mother's home with 2 JR +railing no steps inside the home +tub

## 2019-08-19 NOTE — CONSULT NOTE ADULT - SUBJECTIVE AND OBJECTIVE BOX
Patient is a 53y old  Male who presents with a chief complaint of left knee pain.    HPI: 53M presented with 2 year history of left knee pain. Received cortisone injections in the past with minimal relief. Pain was 0-2/10 at rest and 5/10 with daily activity and 8-9/10 with prolonged activity. Pain was relieved with ibuprofen.  He is now s/p left TKR.  He is feeling well and has no new complaints.   Of note - anesthesia noticed the patient had apneic episodes in the OR.  When discussed with his wife she confirmed that even though he uses a mouthpiece to prevent snoring at night he still gasps for air while he sleeps.      REVIEW OF SYSTEMS:  CONSTITUTIONAL: No fever, weight loss, or fatigue  EYES: No eye pain, visual disturbances, or discharge  ENMT:  No difficulty hearing, tinnitus, vertigo; No sinus or throat pain  NECK: No pain or stiffness  BREASTS: No pain, masses, or nipple discharge  RESPIRATORY: No cough, wheezing, chills or hemoptysis; No shortness of breath  CARDIOVASCULAR: No chest pain, palpitations, dizziness, or leg swelling  GASTROINTESTINAL: No abdominal or epigastric pain. No nausea, vomiting, or hematemesis; No diarrhea or constipation. No melena or hematochezia.  GENITOURINARY: No dysuria, frequency, hematuria, or incontinence  NEUROLOGICAL: No headaches, memory loss, loss of strength, numbness, or tremors  SKIN: No itching, burning, rashes, or lesions   LYMPH NODES: No enlarged glands  ENDOCRINE: No heat or cold intolerance; No hair loss  MUSCULOSKELETAL: No muscle or back pain  PSYCHIATRIC: No depression, anxiety, mood swings, or difficulty sleeping  HEME/LYMPH: No easy bruising, or bleeding gums  ALLERGY AND IMMUNOLOGIC: No hives or eczema    PAST MEDICAL & SURGICAL HISTORY:  Varicose veins: left leg including left knee  Constipation  Primary osteoarthritis of left knee  Nocturia: x 2-3  Urinary hesitancy  BPH (benign prostatic hyperplasia)  Snoring: wears an oral appliance which improved snoring  Palpitations: on occasion  Atrial fibrillation: 2012, after THR and no recurrence since  History of osteoarthritis  Lumbar pain  Cervical herniated disc  Spinal arthritis  Essential hypertension  H/O cervical spine surgery: cervical laminectomy 2012, unsure levels  S/P appendectomy: 2009  S/P hip replacement: right 2013      SOCIAL HISTORY:  Residence: [ ] senior living  [ ] SNF  [x ] Community - with wife and daughter  [ ] Substance abuse: denies  [ ] Tobacco: used to smoke 1 PPD, now down to 4 cig per day but using patch  [ ] Alcohol use: occaisional    Allergies  No Known Allergies    MEDICATIONS  (STANDING):  diltiazem    milliGRAM(s) Oral every 24 hours  lactated ringers. 1000 milliLiter(s) (50 mL/Hr) IV Continuous <Continuous>  nicotine -  14 mG/24Hr(s) Patch 1 patch Transdermal daily    MEDICATIONS  (PRN):  HYDROmorphone  Injectable 0.5 milliGRAM(s) IV Push every 10 minutes PRN Moderate Pain (4 - 6)  nicotine - Inhaler 1 Each Inhalation every 3 hours PRN nicotine withdrawal symptoms  ondansetron Injectable 4 milliGRAM(s) IV Push once PRN Nausea and/or Vomiting      FAMILY HISTORY:  Family history of osteoarthritis (Sibling, Grandparent)  FHx: lung cancer (Father)      Vital Signs Last 24 Hrs  T(C): 36.5 (19 Aug 2019 09:56), Max: 36.8 (19 Aug 2019 06:11)  T(F): 97.7 (19 Aug 2019 09:56), Max: 98.3 (19 Aug 2019 06:11)  HR: 63 (19 Aug 2019 12:55) (55 - 76)  BP: 120/69 (19 Aug 2019 12:55) (94/48 - 137/64)  BP(mean): --  RR: 16 (19 Aug 2019 12:55) (11 - 18)  SpO2: 99% (19 Aug 2019 10:45) (97% - 99%)    PHYSICAL EXAM:    GENERAL: NAD, well-groomed, well-developed  HEAD:  Atraumatic, Normocephalic  EYES: EOMI, PERRLA  ENMT: Moist mucous membranes  NECK: Supple, No JVD  NERVOUS SYSTEM:  Alert & Oriented X3, Good concentration; Moving all 4 extremities;   CHEST/LUNG: Clear to auscultation bilaterally;   HEART: Regular rate and rhythm; No murmurs, rubs, or gallops  ABDOMEN: Soft, Nontender, Nondistended; Bowel sounds present  EXTREMITIES:  2+ Peripheral Pulses, No clubbing, cyanosis, or edema  LYMPH: No lymphadenopathy noted  /RECTAL: Not examined  BREAST: Not examined  SKIN: No rashes or lesions  INCISION: dressing dry and intact    LABS:        CAPILLARY BLOOD GLUCOSE          RADIOLOGY & ADDITIONAL STUDIES:    EKG: NSR  Personally Reviewed:  [x ] YES     Imaging: TKR in place  Personally Reviewed:  [x ] YES     Consultant(s) Notes Reviewed:      Care Discussed with Consultants/Other Providers: d/w anesthesia and Dr Cox regarding carmen; message left for Dr Wagoner

## 2019-08-19 NOTE — BRIEF OPERATIVE NOTE - NSICDXBRIEFOPLAUNCH_GEN_ALL_CORE
Per dr Li Pillai she wants pt back in 2 weeks so PLS WORK PT IN  leave the appt for 5/24 also. .. lvm on both numbers to call and vazquez. .. <--- Click to Launch ICDx for PreOp, PostOp and Procedure

## 2019-08-19 NOTE — PRE-OP CHECKLIST - WEIGHT IN LBS
Last office visit 12/11/18  Next office visit will be tcm after nursing home stay due to fracture    Refills done.  Reports are compliant with drug, quality prescribe, dispense and recipient history per [place] [facility] PDMP       178.7

## 2019-08-19 NOTE — CONSULT NOTE ADULT - PROBLEM SELECTOR RECOMMENDATION 4
discussed nicotine addiction, need to quit.   he has been cutting down and using patch - will continue patch here  discussed using inhaler for more intense cravings - pt is amenable.  smoking cessation discussion with patient and wife approx 8 minutes.

## 2019-08-19 NOTE — CONSULT NOTE ADULT - PROBLEM SELECTOR RECOMMENDATION 2
post op MUNA protocol.  remote tele on [facility].   Dr Cox to see patient  will need sleep study as outpatient.  left message for PMD.

## 2019-08-19 NOTE — OCCUPATIONAL THERAPY INITIAL EVALUATION ADULT - PLANNED THERAPY INTERVENTIONS, OT EVAL
ADL retraining/bed mobility training/transfer training
ADL retraining/bed mobility training/transfer training

## 2019-08-19 NOTE — CONSULT NOTE ADULT - SUBJECTIVE AND OBJECTIVE BOX
PULMONARY/CRITICAL CARE        Patient is a 53y old  Male who presents with a chief complaint of left TKR.   BRIEF HOSPITAL COURSE: ***    Events last 24 hours: ***    PAST MEDICAL & SURGICAL HISTORY:  Varicose veins: left leg including left knee  Constipation  Primary osteoarthritis of left knee  Nocturia: x 2-3  Urinary hesitancy  BPH (benign prostatic hyperplasia)  Snoring: wears an oral appliance which improved snoring, Never had sleep study.   Palpitations: on occasion  Atrial fibrillation: 2012, after THR and no recurrence since  History of osteoarthritis  Lumbar pain  Cervical herniated disc  Spinal arthritis  Essential hypertension  H/O cervical spine surgery: cervical laminectomy 2012, unsure levels  S/P appendectomy: 2009  S/P hip replacement: right 2013    Allergies    No Known Allergies    Intolerances      FAMILY HISTORY/ social: works in [employer]. Smokes 1ppd for more than 20 yrs. Occas etoh.  Family history of osteoarthritis (Sibling, Grandparent)  FHx: lung cancer (Father)      Review of Systems:  CONSTITUTIONAL: No fever, chills, or fatigue  EYES: No eye pain, visual disturbances, or discharge  ENMT:  No difficulty hearing, tinnitus, vertigo; No sinus or throat pain  NECK: No pain or stiffness  RESPIRATORY: No cough, wheezing, chills or hemoptysis; No shortness of breath  CARDIOVASCULAR: No chest pain, palpitations, dizziness, or leg swelling  GASTROINTESTINAL: No abdominal or epigastric pain. No nausea, vomiting, or hematemesis; No diarrhea or constipation. No melena or hematochezia.  GENITOURINARY: No dysuria, frequency, hematuria, or incontinence  NEUROLOGICAL: No headaches, memory loss, loss of strength, numbness, or tremors  SKIN: No itching, burning, rashes, or lesions   MUSCULOSKELETAL: left knee joint pain ; No muscle, back, or extremity pain  PSYCHIATRIC: No depression, anxiety, mood swings, or difficulty sleeping      Medications:  ceFAZolin   IVPB 2000 milliGRAM(s) IV Intermittent every 8 hours    diltiazem    milliGRAM(s) Oral every 24 hours      acetaminophen  IVPB .. 1000 milliGRAM(s) IV Intermittent every 6 hours  celecoxib 200 milliGRAM(s) Oral every 12 hours  HYDROmorphone  Injectable 0.5 milliGRAM(s) IV Push every 3 hours PRN  ondansetron Injectable 4 milliGRAM(s) IV Push every 6 hours PRN  oxyCODONE    IR 5 milliGRAM(s) Oral every 3 hours PRN  oxyCODONE    IR 10 milliGRAM(s) Oral every 3 hours PRN      aspirin enteric coated 81 milliGRAM(s) Oral two times a day    aluminum hydroxide/magnesium hydroxide/simethicone Suspension 30 milliLiter(s) Oral four times a day PRN  docusate sodium 100 milliGRAM(s) Oral three times a day  magnesium hydroxide Suspension 30 milliLiter(s) Oral daily PRN  polyethylene glycol 3350 17 Gram(s) Oral daily PRN  senna 2 Tablet(s) Oral at bedtime PRN        lactated ringers. 1000 milliLiter(s) IV Continuous <Continuous>        nicotine -  14 mG/24Hr(s) Patch 1 patch Transdermal daily  nicotine - 21 mG/24Hr(s) Patch 1 patch Transdermal daily  nicotine - Inhaler 1 Each Inhalation every 3 hours PRN          ICU Vital Signs Last 24 Hrs  T(C): 36.8 (19 Aug 2019 13:46), Max: 36.8 (19 Aug 2019 06:11)  T(F): 98.2 (19 Aug 2019 13:46), Max: 98.3 (19 Aug 2019 06:11)  HR: 70 (19 Aug 2019 13:46) (55 - 76)  BP: 112/66 (19 Aug 2019 13:46) (94/48 - 137/64)  BP(mean): --  ABP: --  ABP(mean): --  RR: 16 (19 Aug 2019 13:46) (11 - 18)  SpO2: 97% (19 Aug 2019 13:46) (97% - 99%)    Vital Signs Last 24 Hrs  T(C): 36.8 (19 Aug 2019 13:46), Max: 36.8 (19 Aug 2019 06:11)  T(F): 98.2 (19 Aug 2019 13:46), Max: 98.3 (19 Aug 2019 06:11)  HR: 70 (19 Aug 2019 13:46) (55 - 76)  BP: 112/66 (19 Aug 2019 13:46) (94/48 - 137/64)  BP(mean): --  RR: 16 (19 Aug 2019 13:46) (11 - 18)  SpO2: 97% (19 Aug 2019 13:46) (97% - 99%)        I&O's Detail    19 Aug 2019 07:01  -  19 Aug 2019 17:52  --------------------------------------------------------  IN:    lactated ringers.: 1000 mL  Total IN: 1000 mL    OUT:    Estimated Blood Loss: 150 mL  Total OUT: 150 mL    Total NET: 850 mL            LABS:                        13.4   x     )-----------( x        ( 19 Aug 2019 17:19 )             40.2     08-19    137  |  104  |  14  ----------------------------<  162<H>  4.3   |  26  |  0.98    Ca    8.3<L>      19 Aug 2019 17:19            CAPILLARY BLOOD GLUCOSE            CULTURES:      Physical Examination:    General: No acute distress.      HEENT: Pupils equal, reactive to light.  Symmetric.    PULM: Clear to auscultation bilaterally, no significant sputum production    CVS: Regular rate and rhythm, no murmurs, rubs, or gallops    ABD: Soft, nondistended, nontender, normoactive bowel sounds, no masses    EXT: No edema, nontender  left knee bandaged. no calf tenderness    SKIN: Warm and well perfused, no rashes noted.    NEURO: Alert, oriented, interactive, nonfocal    RADIOLOGY: ***    CRITICAL CARE TIME SPENT: ***

## 2019-08-20 ENCOUNTER — TRANSCRIPTION ENCOUNTER (OUTPATIENT)
Age: 54
End: 2019-08-20

## 2019-08-20 DIAGNOSIS — N40.1 BENIGN PROSTATIC HYPERPLASIA WITH LOWER URINARY TRACT SYMPTOMS: ICD-10-CM

## 2019-08-20 LAB
ANION GAP SERPL CALC-SCNC: 5 MMOL/L — SIGNIFICANT CHANGE UP (ref 5–17)
BUN SERPL-MCNC: 15 MG/DL — SIGNIFICANT CHANGE UP (ref 7–23)
CALCIUM SERPL-MCNC: 8.7 MG/DL — SIGNIFICANT CHANGE UP (ref 8.4–10.5)
CHLORIDE SERPL-SCNC: 108 MMOL/L — SIGNIFICANT CHANGE UP (ref 96–108)
CO2 SERPL-SCNC: 28 MMOL/L — SIGNIFICANT CHANGE UP (ref 22–31)
CREAT SERPL-MCNC: 0.98 MG/DL — SIGNIFICANT CHANGE UP (ref 0.5–1.3)
GLUCOSE SERPL-MCNC: 154 MG/DL — HIGH (ref 70–99)
HCT VFR BLD CALC: 34.7 % — LOW (ref 39–50)
HGB BLD-MCNC: 11.3 G/DL — LOW (ref 13–17)
MCHC RBC-ENTMCNC: 31.1 PG — SIGNIFICANT CHANGE UP (ref 27–34)
MCHC RBC-ENTMCNC: 32.6 GM/DL — SIGNIFICANT CHANGE UP (ref 32–36)
MCV RBC AUTO: 95.6 FL — SIGNIFICANT CHANGE UP (ref 80–100)
NRBC # BLD: 0 /100 WBCS — SIGNIFICANT CHANGE UP (ref 0–0)
PLATELET # BLD AUTO: 179 K/UL — SIGNIFICANT CHANGE UP (ref 150–400)
POTASSIUM SERPL-MCNC: 4.8 MMOL/L — SIGNIFICANT CHANGE UP (ref 3.5–5.3)
POTASSIUM SERPL-SCNC: 4.8 MMOL/L — SIGNIFICANT CHANGE UP (ref 3.5–5.3)
RBC # BLD: 3.63 M/UL — LOW (ref 4.2–5.8)
RBC # FLD: 12.4 % — SIGNIFICANT CHANGE UP (ref 10.3–14.5)
SODIUM SERPL-SCNC: 141 MMOL/L — SIGNIFICANT CHANGE UP (ref 135–145)
WBC # BLD: 11.33 K/UL — HIGH (ref 3.8–10.5)
WBC # FLD AUTO: 11.33 K/UL — HIGH (ref 3.8–10.5)

## 2019-08-20 PROCEDURE — 99233 SBSQ HOSP IP/OBS HIGH 50: CPT

## 2019-08-20 RX ORDER — TAMSULOSIN HYDROCHLORIDE 0.4 MG/1
0.4 CAPSULE ORAL AT BEDTIME
Refills: 0 | Status: DISCONTINUED | OUTPATIENT
Start: 2019-08-20 | End: 2019-08-21

## 2019-08-20 RX ORDER — PANTOPRAZOLE SODIUM 20 MG/1
1 TABLET, DELAYED RELEASE ORAL
Qty: 30 | Refills: 1
Start: 2019-08-20 | End: 2019-10-18

## 2019-08-20 RX ORDER — ACETAMINOPHEN 500 MG
2 TABLET ORAL
Qty: 0 | Refills: 0 | DISCHARGE
Start: 2019-08-20

## 2019-08-20 RX ORDER — ASPIRIN/CALCIUM CARB/MAGNESIUM 324 MG
162 TABLET ORAL EVERY 12 HOURS
Refills: 0 | Status: DISCONTINUED | OUTPATIENT
Start: 2019-08-20 | End: 2019-08-21

## 2019-08-20 RX ORDER — DOCUSATE SODIUM 100 MG
1 CAPSULE ORAL
Qty: 0 | Refills: 0 | DISCHARGE
Start: 2019-08-20

## 2019-08-20 RX ORDER — IBUPROFEN 200 MG
2 TABLET ORAL
Qty: 0 | Refills: 0 | DISCHARGE

## 2019-08-20 RX ORDER — ASPIRIN/CALCIUM CARB/MAGNESIUM 324 MG
81 TABLET ORAL ONCE
Refills: 0 | Status: COMPLETED | OUTPATIENT
Start: 2019-08-20 | End: 2019-08-20

## 2019-08-20 RX ORDER — NICOTINE POLACRILEX 2 MG
0 GUM BUCCAL
Qty: 0 | Refills: 0 | DISCHARGE
Start: 2019-08-20

## 2019-08-20 RX ORDER — ASPIRIN/CALCIUM CARB/MAGNESIUM 324 MG
2 TABLET ORAL
Qty: 0 | Refills: 0 | DISCHARGE
Start: 2019-08-20

## 2019-08-20 RX ORDER — CELECOXIB 200 MG/1
1 CAPSULE ORAL
Qty: 60 | Refills: 0
Start: 2019-08-20 | End: 2019-09-18

## 2019-08-20 RX ORDER — PANTOPRAZOLE SODIUM 20 MG/1
40 TABLET, DELAYED RELEASE ORAL
Refills: 0 | Status: DISCONTINUED | OUTPATIENT
Start: 2019-08-20 | End: 2019-08-21

## 2019-08-20 RX ORDER — SENNA PLUS 8.6 MG/1
2 TABLET ORAL
Qty: 0 | Refills: 0 | DISCHARGE
Start: 2019-08-20

## 2019-08-20 RX ADMIN — SODIUM CHLORIDE 100 MILLILITER(S): 9 INJECTION, SOLUTION INTRAVENOUS at 06:12

## 2019-08-20 RX ADMIN — CELECOXIB 200 MILLIGRAM(S): 200 CAPSULE ORAL at 21:01

## 2019-08-20 RX ADMIN — Medication 400 MILLIGRAM(S): at 03:15

## 2019-08-20 RX ADMIN — Medication 120 MILLIGRAM(S): at 21:00

## 2019-08-20 RX ADMIN — Medication 1 PATCH: at 19:00

## 2019-08-20 RX ADMIN — Medication 100 MILLIGRAM(S): at 21:01

## 2019-08-20 RX ADMIN — OXYCODONE HYDROCHLORIDE 10 MILLIGRAM(S): 5 TABLET ORAL at 01:56

## 2019-08-20 RX ADMIN — Medication 1000 MILLIGRAM(S): at 03:30

## 2019-08-20 RX ADMIN — OXYCODONE HYDROCHLORIDE 10 MILLIGRAM(S): 5 TABLET ORAL at 17:55

## 2019-08-20 RX ADMIN — MAGNESIUM HYDROXIDE 30 MILLILITER(S): 400 TABLET, CHEWABLE ORAL at 21:00

## 2019-08-20 RX ADMIN — OXYCODONE HYDROCHLORIDE 10 MILLIGRAM(S): 5 TABLET ORAL at 18:25

## 2019-08-20 RX ADMIN — Medication 81 MILLIGRAM(S): at 06:12

## 2019-08-20 RX ADMIN — OXYCODONE HYDROCHLORIDE 10 MILLIGRAM(S): 5 TABLET ORAL at 09:26

## 2019-08-20 RX ADMIN — OXYCODONE HYDROCHLORIDE 10 MILLIGRAM(S): 5 TABLET ORAL at 08:56

## 2019-08-20 RX ADMIN — Medication 1 PATCH: at 07:44

## 2019-08-20 RX ADMIN — CELECOXIB 200 MILLIGRAM(S): 200 CAPSULE ORAL at 21:30

## 2019-08-20 RX ADMIN — OXYCODONE HYDROCHLORIDE 5 MILLIGRAM(S): 5 TABLET ORAL at 13:07

## 2019-08-20 RX ADMIN — Medication 1 PATCH: at 22:12

## 2019-08-20 RX ADMIN — Medication 162 MILLIGRAM(S): at 18:02

## 2019-08-20 RX ADMIN — OXYCODONE HYDROCHLORIDE 5 MILLIGRAM(S): 5 TABLET ORAL at 13:37

## 2019-08-20 RX ADMIN — CELECOXIB 200 MILLIGRAM(S): 200 CAPSULE ORAL at 08:57

## 2019-08-20 RX ADMIN — OXYCODONE HYDROCHLORIDE 10 MILLIGRAM(S): 5 TABLET ORAL at 01:26

## 2019-08-20 RX ADMIN — Medication 81 MILLIGRAM(S): at 13:07

## 2019-08-20 RX ADMIN — Medication 100 MILLIGRAM(S): at 06:11

## 2019-08-20 RX ADMIN — Medication 100 MILLIGRAM(S): at 13:07

## 2019-08-20 RX ADMIN — TAMSULOSIN HYDROCHLORIDE 0.4 MILLIGRAM(S): 0.4 CAPSULE ORAL at 21:00

## 2019-08-20 RX ADMIN — Medication 1000 MILLIGRAM(S): at 09:27

## 2019-08-20 RX ADMIN — OXYCODONE HYDROCHLORIDE 10 MILLIGRAM(S): 5 TABLET ORAL at 21:00

## 2019-08-20 RX ADMIN — PANTOPRAZOLE SODIUM 40 MILLIGRAM(S): 20 TABLET, DELAYED RELEASE ORAL at 07:49

## 2019-08-20 RX ADMIN — Medication 1000 MILLIGRAM(S): at 08:57

## 2019-08-20 RX ADMIN — OXYCODONE HYDROCHLORIDE 10 MILLIGRAM(S): 5 TABLET ORAL at 21:30

## 2019-08-20 RX ADMIN — CELECOXIB 200 MILLIGRAM(S): 200 CAPSULE ORAL at 09:27

## 2019-08-20 NOTE — DISCHARGE NOTE PROVIDER - NSDCACTIVITY_GEN_ALL_CORE
Walking - Outdoors allowed/Stairs allowed/No heavy lifting/straining/Walking - Indoors allowed/Showering allowed/Do not drive or operate machinery

## 2019-08-20 NOTE — DISCHARGE NOTE NURSING/CASE MANAGEMENT/SOCIAL WORK - NSDCDPATPORTLINK_GEN_ALL_CORE
You can access the [employer] Patient Portal, offered by [facility], by registering with the following website: [email or web address]

## 2019-08-20 NOTE — DISCHARGE NOTE PROVIDER - NSDCCPCAREPLAN_GEN_ALL_CORE_FT
PRINCIPAL DISCHARGE DIAGNOSIS  Diagnosis: DJD (degenerative joint disease) of knee  Assessment and Plan of Treatment: LEFT knee.  Your new total knee requires proper care.  Your care provider is your best resource for care information.  Please follow these basic guidelines.  Use pain medication if needed as prescribed.  Ice packs are a helpful addition to your comfort.  Applying a  waterproof ice pack over a cloth or thin towel to the site for 30 minutes per hour may provide benefit by reducing swelling and discomfort.  Your Physical Therapy/Occupational Therapy may include ambulation, transfers, stairs, ADL's (activities of daily living), range of motion exercises, and isometrics.  Your participation is vital to your recovery.  -Your Activity  • Weight Bearing as tolerated with rolling walker.  • Take short, frequent walks increasing the distance that you walk each day as tolerated.  • Change your position every hour to decrease pain and stiffness.  • Continue the exercises taught to you by your physical therapist.  • No driving until cleared by the doctor.  • No tub baths, hot tubs, or swimming pools until instructed by your doctor.  • Do not squat down on the floor.  • Do not kneel or twist your knee.  Keep incision clean and dry. Change the dressing daily if there is drainage noted from surgical wound. When there is no drainage, the wound may be left open to air.  Salves, ointments or other topical treatments are not to be used on the wound unless specifically recommended by your doctor.   If you have sutures (stiches) and no drainage form the incision you may shower allowing water to rinse the incision and pat it  dry afterward with a clean towel.  If you have Prineo (tape/glue) you may shower and pat the wound dry.  Prineo removal is done in the office 2 weeks after surgery.  If you have further questions or problems call your doctor's office.

## 2019-08-20 NOTE — DISCHARGE NOTE PROVIDER - NSDCFUSCHEDAPPT_GEN_ALL_CORE_FT
MYRNA GARCIA ; 09/03/2019 ; [facility] [address] [place] [address]  MYRNA GARCIA ; 10/29/2019 ; [address] MYRNA GARCIA ; 09/03/2019 ; [facility] [address] [place] [address]  MYRNA GARCIA ; 10/29/2019 ; [address] MYRNA GARCIA ; 09/03/2019 ; [facility] [address] [place] [address]  MYRNA GARCIA ; 10/29/2019 ; [address]

## 2019-08-20 NOTE — DISCHARGE NOTE NURSING/CASE MANAGEMENT/SOCIAL WORK - NSDCPEEMAIL_GEN_ALL_CORE
[facility] for Tobacco Control email [email or web address]

## 2019-08-20 NOTE — DISCHARGE NOTE PROVIDER - NSDCCPGOAL_GEN_ALL_CORE_FT
To get better and follow your care plan as instructed. To get better and follow your care plan as instructed. Improve ambulation, ADLs and quality of life.

## 2019-08-20 NOTE — DISCHARGE NOTE PROVIDER - INSTRUCTIONS
Regular diet  For Constipation :   • Increase your daily water intake.   • Try adding fiber to your diet by eating fruits, vegetables and foods that are rich in grains.  • If you do experience constipation, you may take an over-the-counter stool softener/laxative such as Colace, Senokot , Milk of Magnesia or Miralax.

## 2019-08-20 NOTE — DISCHARGE NOTE PROVIDER - CARE PROVIDER_API CALL
Luis Carlos Gil)  Orthopaedic Surgery  [address], [address]  [place] [address]  Phone: [phone]  Fax: [phone]  Follow Up Time: Luis Carlos Gil)  Orthopaedic Surgery  [address], [address]  [place] [address]  Phone: [phone]  Fax: [phone]  Follow Up Time: 2 weeks

## 2019-08-20 NOTE — DISCHARGE NOTE NURSING/CASE MANAGEMENT/SOCIAL WORK - NSDCPEWEB_GEN_ALL_CORE
[place] website --- [email or web address] Tobacco [email or web address] website --- [email or web address]

## 2019-08-20 NOTE — DISCHARGE NOTE PROVIDER - HOSPITAL COURSE
This 54yo male patient was admitted to [facility] on 8/19/19 with a history of severe degenerative joint disease of the left knee and for elective total joint replacement.  Patient had appropriate preop medical evaluation and testing.    Patient received antibiotics according to SCIP guidelines for infection prevention.  The patient underwent an uncomplicated left total knee replacement by Dr. Luis Carlos Gil on 8/19.   Ecotrin was given for DVT prophylaxis and management atrial fibrillation.  Anesthesia, Medical Hospitalist, Physical Therapy and Occupational Therapy were consulted.  Patient is stable for discharge home with home care services and with a good prognosis on ***.  Appropriate discharge instructions and medications are provided in this document. This 52yo male patient was admitted to [facility] on 8/19/19 with a history of severe degenerative joint disease of the left knee and for elective total joint replacement.  Patient had appropriate preop medical evaluation and testing.    Patient received antibiotics according to SCIP guidelines for infection prevention.  The patient underwent an uncomplicated left total knee replacement by Dr. Luis Carlos Gil on 8/19.   Ecotrin was given for DVT prophylaxis and management atrial fibrillation.  Anesthesia, Medical Hospitalist, Physical Therapy and Occupational Therapy were consulted.  Patient is stable for discharge home with home care services and with a good prognosis on 8/21/19.  Appropriate discharge instructions and medications are provided in this document.

## 2019-08-20 NOTE — DISCHARGE NOTE NURSING/CASE MANAGEMENT/SOCIAL WORK - NSSCNAMETXT_GEN_ALL_CORE
[facility] (formerly [facility])  [phone]

## 2019-08-20 NOTE — DISCHARGE NOTE NURSING/CASE MANAGEMENT/SOCIAL WORK - NSSCCONTNUM_GEN_ALL_CORE
Registered Nurse to visit the day after hospital discharge; Physical Therapist to follow. Please contact the home care agency at the above phone number if you have not heard from them by 12 noon on the day after your hospital discharge.

## 2019-08-21 VITALS
TEMPERATURE: 98 F | HEART RATE: 81 BPM | SYSTOLIC BLOOD PRESSURE: 123 MMHG | RESPIRATION RATE: 14 BRPM | OXYGEN SATURATION: 96 % | DIASTOLIC BLOOD PRESSURE: 66 MMHG

## 2019-08-21 LAB
ANION GAP SERPL CALC-SCNC: 4 MMOL/L — LOW (ref 5–17)
BUN SERPL-MCNC: 12 MG/DL — SIGNIFICANT CHANGE UP (ref 7–23)
CALCIUM SERPL-MCNC: 8.8 MG/DL — SIGNIFICANT CHANGE UP (ref 8.4–10.5)
CHLORIDE SERPL-SCNC: 107 MMOL/L — SIGNIFICANT CHANGE UP (ref 96–108)
CO2 SERPL-SCNC: 29 MMOL/L — SIGNIFICANT CHANGE UP (ref 22–31)
CREAT SERPL-MCNC: 0.93 MG/DL — SIGNIFICANT CHANGE UP (ref 0.5–1.3)
GLUCOSE SERPL-MCNC: 106 MG/DL — HIGH (ref 70–99)
HCT VFR BLD CALC: 32.8 % — LOW (ref 39–50)
HGB BLD-MCNC: 10.6 G/DL — LOW (ref 13–17)
MCHC RBC-ENTMCNC: 31.5 PG — SIGNIFICANT CHANGE UP (ref 27–34)
MCHC RBC-ENTMCNC: 32.3 GM/DL — SIGNIFICANT CHANGE UP (ref 32–36)
MCV RBC AUTO: 97.3 FL — SIGNIFICANT CHANGE UP (ref 80–100)
NRBC # BLD: 0 /100 WBCS — SIGNIFICANT CHANGE UP (ref 0–0)
PLATELET # BLD AUTO: 171 K/UL — SIGNIFICANT CHANGE UP (ref 150–400)
POTASSIUM SERPL-MCNC: 4.7 MMOL/L — SIGNIFICANT CHANGE UP (ref 3.5–5.3)
POTASSIUM SERPL-SCNC: 4.7 MMOL/L — SIGNIFICANT CHANGE UP (ref 3.5–5.3)
RBC # BLD: 3.37 M/UL — LOW (ref 4.2–5.8)
RBC # FLD: 13.1 % — SIGNIFICANT CHANGE UP (ref 10.3–14.5)
SODIUM SERPL-SCNC: 140 MMOL/L — SIGNIFICANT CHANGE UP (ref 135–145)
WBC # BLD: 9.21 K/UL — SIGNIFICANT CHANGE UP (ref 3.8–10.5)
WBC # FLD AUTO: 9.21 K/UL — SIGNIFICANT CHANGE UP (ref 3.8–10.5)

## 2019-08-21 PROCEDURE — 85014 HEMATOCRIT: CPT

## 2019-08-21 PROCEDURE — 85018 HEMOGLOBIN: CPT

## 2019-08-21 PROCEDURE — 99239 HOSP IP/OBS DSCHRG MGMT >30: CPT

## 2019-08-21 PROCEDURE — 88311 DECALCIFY TISSUE: CPT

## 2019-08-21 PROCEDURE — 80048 BASIC METABOLIC PNL TOTAL CA: CPT

## 2019-08-21 PROCEDURE — 97165 OT EVAL LOW COMPLEX 30 MIN: CPT

## 2019-08-21 PROCEDURE — 97116 GAIT TRAINING THERAPY: CPT

## 2019-08-21 PROCEDURE — C1713: CPT

## 2019-08-21 PROCEDURE — 97110 THERAPEUTIC EXERCISES: CPT

## 2019-08-21 PROCEDURE — 85027 COMPLETE CBC AUTOMATED: CPT

## 2019-08-21 PROCEDURE — 97535 SELF CARE MNGMENT TRAINING: CPT

## 2019-08-21 PROCEDURE — 36415 COLL VENOUS BLD VENIPUNCTURE: CPT

## 2019-08-21 PROCEDURE — C1776: CPT

## 2019-08-21 PROCEDURE — 73562 X-RAY EXAM OF KNEE 3: CPT

## 2019-08-21 PROCEDURE — 97530 THERAPEUTIC ACTIVITIES: CPT

## 2019-08-21 PROCEDURE — 97161 PT EVAL LOW COMPLEX 20 MIN: CPT

## 2019-08-21 PROCEDURE — 88305 TISSUE EXAM BY PATHOLOGIST: CPT

## 2019-08-21 RX ORDER — OXYCODONE HYDROCHLORIDE 5 MG/1
1 TABLET ORAL
Qty: 42 | Refills: 0
Start: 2019-08-21 | End: 2019-08-27

## 2019-08-21 RX ADMIN — PANTOPRAZOLE SODIUM 40 MILLIGRAM(S): 20 TABLET, DELAYED RELEASE ORAL at 06:20

## 2019-08-21 RX ADMIN — Medication 1000 MILLIGRAM(S): at 18:15

## 2019-08-21 RX ADMIN — Medication 1000 MILLIGRAM(S): at 17:45

## 2019-08-21 RX ADMIN — CELECOXIB 200 MILLIGRAM(S): 200 CAPSULE ORAL at 08:53

## 2019-08-21 RX ADMIN — OXYCODONE HYDROCHLORIDE 10 MILLIGRAM(S): 5 TABLET ORAL at 04:41

## 2019-08-21 RX ADMIN — OXYCODONE HYDROCHLORIDE 10 MILLIGRAM(S): 5 TABLET ORAL at 04:11

## 2019-08-21 RX ADMIN — Medication 162 MILLIGRAM(S): at 17:45

## 2019-08-21 RX ADMIN — POLYETHYLENE GLYCOL 3350 17 GRAM(S): 17 POWDER, FOR SOLUTION ORAL at 10:43

## 2019-08-21 RX ADMIN — Medication 100 MILLIGRAM(S): at 06:20

## 2019-08-21 RX ADMIN — Medication 1 PATCH: at 08:22

## 2019-08-21 RX ADMIN — Medication 1000 MILLIGRAM(S): at 10:44

## 2019-08-21 RX ADMIN — Medication 100 MILLIGRAM(S): at 13:18

## 2019-08-21 RX ADMIN — Medication 1000 MILLIGRAM(S): at 11:14

## 2019-08-21 RX ADMIN — CELECOXIB 200 MILLIGRAM(S): 200 CAPSULE ORAL at 08:23

## 2019-08-21 RX ADMIN — Medication 162 MILLIGRAM(S): at 06:20

## 2019-08-21 NOTE — PROGRESS NOTE ADULT - PROBLEM SELECTOR PROBLEM 1
MUNA (obstructive sleep apnea)
Primary osteoarthritis of left knee
Primary osteoarthritis of left knee
MUNA (obstructive sleep apnea)

## 2019-08-21 NOTE — PROGRESS NOTE ADULT - PROBLEM SELECTOR PROBLEM 5
BPH with obstruction/lower urinary tract symptoms
BPH with obstruction/lower urinary tract symptoms
Paroxysmal atrial fibrillation
Paroxysmal atrial fibrillation

## 2019-08-21 NOTE — PROGRESS NOTE ADULT - SUBJECTIVE AND OBJECTIVE BOX
[facility]opedic [facility]A.- POD# 1- s/p Left TKR    Patient alert and comfortable in bed with oral  meds for pain control.  Denies knee pain or nausea. Amanda placed postop for retention.    Vital Signs Last 24 Hrs  T(C): 36.7 (20 Aug 2019 07:40), Max: 36.8 (19 Aug 2019 13:46)  T(F): 98 (20 Aug 2019 07:40), Max: 98.2 (19 Aug 2019 13:46)  HR: 73 (20 Aug 2019 07:40) (55 - 81)  BP: 105/62 (20 Aug 2019 07:40) (94/48 - 128/91)  BP(mean): --  RR: 17 (20 Aug 2019 07:40) (11 - 18)  SpO2: 97% (20 Aug 2019 07:40) (96% - 99%)         I&O's Detail    19 Aug 2019 07:01  -  20 Aug 2019 07:00  --------------------------------------------------------  IN:    IV PiggyBack: 350 mL    lactated ringers.: 1500 mL    lactated ringers.: 1000 mL  Total IN: 2850 mL    OUT:    Estimated Blood Loss: 150 mL in OR    Indwelling Catheter - Urethral: 2300 mL  Total OUT: 2450 mL    Total NET: 400 mL            Labs: ***BMP in testing***                                                                  11.3<L>  11.33<H> )-----------( 179      ( 20 Aug 2019 07:44 )             34.7<L>  20 Aug 2019 07:44                                19 Aug 2019 17:19    137    |  104    |  14     ----------------------------<  162<H>  4.3     |  26     |  0.98     Ca    8.3<L>      19 Aug 2019 17:19        MEDICATIONS:acetaminophen   Tablet .. 1000 milliGRAM(s) Oral every 8 hours  aluminum hydroxide/magnesium hydroxide/simethicone Suspension 30 milliLiter(s) Oral four times a day PRN  aspirin enteric coated 81 milliGRAM(s) Oral two times a day  celecoxib 200 milliGRAM(s) Oral every 12 hours  diltiazem    milliGRAM(s) Oral every 24 hours  docusate sodium 100 milliGRAM(s) Oral three times a day  HYDROmorphone  Injectable 0.5 milliGRAM(s) IV Push every 3 hours PRN  lactated ringers. 1000 milliLiter(s) IV Continuous <Continuous>  magnesium hydroxide Suspension 30 milliLiter(s) Oral daily PRN  nicotine -  14 mG/24Hr(s) Patch 1 patch Transdermal daily  nicotine - Inhaler 1 Each Inhalation every 3 hours PRN  ondansetron Injectable 4 milliGRAM(s) IV Push every 6 hours PRN  oxyCODONE    IR 5 milliGRAM(s) Oral every 3 hours PRN  oxyCODONE    IR 10 milliGRAM(s) Oral every 3 hours PRN  pantoprazole    Tablet 40 milliGRAM(s) Oral before breakfast  polyethylene glycol 3350 17 Gram(s) Oral daily PRN  senna 2 Tablet(s) Oral at bedtime PRN    Anticoagulation:  aspirin enteric coated 81 milliGRAM(s) Oral every 12 hours to start this AM      Antibiotics: complete      Pain medications:   acetaminophen   Tablet .. 1000 milliGRAM(s) Oral every 8 hours  celecoxib 200 milliGRAM(s) Oral every 12 hours  HYDROmorphone  Injectable 0.5 milliGRAM(s) IV Push every 3 hours PRN  ondansetron Injectable 4 milliGRAM(s) IV Push every 6 hours PRN  oxyCODONE    IR 5 milliGRAM(s) Oral every 3 hours PRN  oxyCODONE    IR 10 milliGRAM(s) Oral every 3 hours PRN                                      Physical Exam:  Left knee- Primary surgical bandage dry and intact.  Neurovascular grossly intact LE's.  PAS on LE's.  Calves soft and non-tender.                                                                                                                                                          A/P:  Orthopedically stable.  -Continue pain management with above plan.  -DVT prophylaxis with 6 weeks of Ecotrin starting today.  -CBC OK, check BMP; RECheck labs again tomorrow  -Increase ambulation and ROM left knee with PT/OT; Remove Amanda after PT/OT today.  -Dr. Zapien for continued medical care  -Discharge planning for Home tomorrow.  -Further plan as per attendings.
Discharge medication calendar:  (ASA 325mg Qday preop)  [id number] q12h x 6 weeks then resume ASA 325mg Qday  APAP 1000mg q8h x 2-3 weeks  Celecoxib 200mg q12h x 2-3 weeks  Pantoprazole 40mg QAM x 6 weeks  Narcotic PRN  Docusate 100mg TID while taking narcotic  Miralax, Senna, or Bisacodyl PRN for treatment of constipation
MYRNA [id number]    Pt is a 53y year old Male s/p left TKR. pain is 1/10. Anesthesia was spinal. Denies chest pain/shortness of breath/nausea/vomitting.     T(C): 36.8 (08-19-19 @ 13:46), Max: 36.8 (08-19-19 @ 06:11)  HR: 70 (08-19-19 @ 13:46) (55 - 76)  BP: 112/66 (08-19-19 @ 13:46) (94/48 - 137/64)  RR: 16 (08-19-19 @ 13:46) (11 - 18)  SpO2: 97% (08-19-19 @ 13:46) (97% - 99%)  Wt(kg): --      08-19 @ 07:01  -  08-19 @ 15:02  --------------------------------------------------------  IN: 1000 mL / OUT: 150 mL / NET: 850 mL        PE:   Left LE: Dressing CDI, SILT distally, (+2), EHL intact, Capillar refill < 2 seconds. HV in place, PAS on.     A: 53y year old Male s/p left TKR POD#0    Plan:   -DVT ppx=ecotrin 81 mg bid  -PT/OT = OOB  -Pain control   -Medicine to follow   -continue antibiotics as per SCIP protocol  -Follow up Labs  -Incentive spirometry, continue use of PAS
PULMONARY/CRITICAL CARE      INTERVAL HPI/OVERNIGHT EVENTS:    53y MaleHPI:    Doing well, no sob. Wore oral device. Mild knee pain.     PAST MEDICAL & SURGICAL HISTORY:  Varicose veins: left leg including left knee  Constipation  Primary osteoarthritis of left knee  Nocturia: x 2-3  Urinary hesitancy  BPH (benign prostatic hyperplasia)  Snoring: wears an oral appliance which improved snoring, no witnessed apneas  Palpitations: on occasion  Atrial fibrillation: 2012, after THR and no recurrence since  History of osteoarthritis  Lumbar pain  Cervical herniated disc  Spinal arthritis  Essential hypertension  H/O cervical spine surgery: cervical laminectomy 2012, unsure levels  S/P appendectomy: 2009  S/P hip replacement: right 2013        ICU Vital Signs Last 24 Hrs  T(C): 36.8 (20 Aug 2019 03:20), Max: 36.8 (19 Aug 2019 13:46)  T(F): 98.2 (20 Aug 2019 03:20), Max: 98.2 (19 Aug 2019 13:46)  HR: 78 (20 Aug 2019 03:20) (55 - 81)  BP: 110/68 (20 Aug 2019 03:20) (94/48 - 128/91)  BP(mean): --  ABP: --  ABP(mean): --  RR: 16 (20 Aug 2019 03:20) (11 - 18)  SpO2: 96% (20 Aug 2019 03:20) (96% - 99%)    Qtts:     I&O's Summary    19 Aug 2019 07:01  -  20 Aug 2019 07:00  --------------------------------------------------------  IN: 2850 mL / OUT: 2450 mL / NET: 400 mL              PHYSICAL EXAM:    GENERAL: NAD, well-groomed, well-developed, NAD  HEAD:  Atraumatic, Normocephalic  EYES: EOMI, PERRLA, conjunctiva and sclera clear  ENMT: No tonsillar erythema, exudates, or enlargement; Moist mucous membranes, Good dentition, No lesions  NECK: Supple, No JVD, Normal thyroid  NERVOUS SYSTEM:  Alert & Oriented X3, Good concentration; Motor Strength 5/5 B/L upper and lower extremities  CHEST/LUNG: Clear to percussion bilaterally; No rales, rhonchi, wheezing, or rubs  HEART: Regular rate and rhythm; No murmurs, rubs, or gallops  ABDOMEN: Soft, Nontender, Nondistended; Bowel sounds present  EXTREMITIES:  2+ Peripheral Pulses, No clubbing, cyanosis, or edema  Left knee bandaged. no calf tenderness  LYMPH: No lymphadenopathy noted  SKIN: No rashes or lesions        LABS:                        13.4   x     )-----------( x        ( 19 Aug 2019 17:19 )             40.2     08-19    137  |  104  |  14  ----------------------------<  162<H>  4.3   |  26  |  0.98    Ca    8.3<L>      19 Aug 2019 17:19            vanco through     RADIOLOGY & ADDITIONAL STUDIES:      CRITICAL CARE TIME SPENT:
Patient is a 53y old  Male who presents with a chief complaint of Elective left total knee replacement (20 Aug 2019 12:34)    INTERVAL HPI/OVERNIGHT EVENTS: feeling well, no complaints, pain controlled.  chanel placed last night for urine retention, was removed this Afternoon.     MEDICATIONS  (STANDING):  acetaminophen   Tablet .. 1000 milliGRAM(s) Oral every 8 hours  aspirin enteric coated 162 milliGRAM(s) Oral every 12 hours  celecoxib 200 milliGRAM(s) Oral every 12 hours  diltiazem    milliGRAM(s) Oral every 24 hours  docusate sodium 100 milliGRAM(s) Oral three times a day  lactated ringers. 1000 milliLiter(s) (100 mL/Hr) IV Continuous <Continuous>  nicotine -  14 mG/24Hr(s) Patch 1 patch Transdermal daily  pantoprazole    Tablet 40 milliGRAM(s) Oral before breakfast    MEDICATIONS  (PRN):  aluminum hydroxide/magnesium hydroxide/simethicone Suspension 30 milliLiter(s) Oral four times a day PRN Indigestion  HYDROmorphone  Injectable 0.5 milliGRAM(s) IV Push every 3 hours PRN Severe Pain (7 - 10)  magnesium hydroxide Suspension 30 milliLiter(s) Oral daily PRN Constipation  nicotine - Inhaler 1 Each Inhalation every 3 hours PRN nicotine withdrawal symptoms  ondansetron Injectable 4 milliGRAM(s) IV Push every 6 hours PRN Nausea and/or Vomiting  oxyCODONE    IR 5 milliGRAM(s) Oral every 3 hours PRN Mild Pain (1 - 3)  oxyCODONE    IR 10 milliGRAM(s) Oral every 3 hours PRN Moderate Pain (4 - 6)  polyethylene glycol 3350 17 Gram(s) Oral daily PRN Constipation  senna 2 Tablet(s) Oral at bedtime PRN Constipation    Allergies  No Known Allergies      REVIEW OF SYSTEMS:  CONSTITUTIONAL: No fever, weight loss, or fatigue  EYES: No eye pain, visual disturbances, or discharge  ENMT:  No difficulty hearing, tinnitus, vertigo; No sinus or throat pain  NECK: No pain or stiffness  BREASTS: No pain, masses, or nipple discharge  RESPIRATORY: No cough, wheezing, chills or hemoptysis; No shortness of breath  CARDIOVASCULAR: No chest pain, palpitations, or lightheadedness  GASTROINTESTINAL: No abdominal or epigastric pain. No nausea, vomiting, or hematemesis; No diarrhea or constipation. No melena or hematochezia.  GENITOURINARY: see hpi  NEUROLOGICAL: No headaches, vertigo, memory loss, loss of strength, numbness, or tremors  SKIN: No itching, burning, rashes, or lesions   LYMPH NODES: No enlarged glands  ENDOCRINE: No heat or cold intolerance; No hair loss; No polydipsia or polyuria  MUSCULOSKELETAL: No back pain  PSYCHIATRIC: No depression, anxiety, or mood swings  HEME/LYMPH: No easy bruising, or bleeding gums  ALLERGY AND IMMUNOLOGIC: No hives or eczema    Vital Signs Last 24 Hrs  T(C): 36.8 (20 Aug 2019 11:27), Max: 36.8 (19 Aug 2019 19:15)  T(F): 98.2 (20 Aug 2019 11:27), Max: 98.2 (19 Aug 2019 19:15)  HR: 74 (20 Aug 2019 11:27) (73 - 81)  BP: 127/65 (20 Aug 2019 11:27) (105/62 - 127/65)  BP(mean): --  RR: 17 (20 Aug 2019 11:27) (16 - 17)  SpO2: 97% (20 Aug 2019 11:27) (96% - 97%)    PHYSICAL EXAM:  GENERAL: NAD, well-groomed, well-developed  HEAD:  Atraumatic, Normocephalic  EYES:  conjunctiva and sclera clear  ENMT: Moist mucous membranes  NECK: Supple, No JVD, Normal thyroid  NERVOUS SYSTEM:  Alert & Oriented X3, Good concentration; Bilateral LE mobile, sensation to light touch intact  CHEST/LUNG: Clear to auscultation bilaterally; No rales, rhonchi, wheezing, or rubs  HEART: Regular rate and rhythm; No murmurs, rubs, or gallops  ABDOMEN: Soft, Nontender, Nondistended; Bowel sounds present  EXTREMITIES:  2+ Peripheral Pulses, No clubbing or cyanosis  LYMPH: No lymphadenopathy noted  SKIN: No rashes or lesions  INCISION:  Dressing dry and intact    LABS:                        11.3   11.33 )-----------( 179      ( 20 Aug 2019 07:44 )             34.7     20 Aug 2019 07:44    141    |  108    |  15     ----------------------------<  154    4.8     |  28     |  0.98     Ca    8.7        20 Aug 2019 07:44          CAPILLARY BLOOD GLUCOSE          RADIOLOGY & ADDITIONAL TESTS:    Imaging Personally Reviewed:      [ ] Consultant(s) Notes Reviewed  [x] Care Discussed with Consultants/Other Providers:  Ortho PA- plan of care; Dr Wagoner updated
Patient is a 53y old  Male who presents with a chief complaint of S/P Left TKR 8/19/19 (21 Aug 2019 10:31)    INTERVAL HPI/OVERNIGHT EVENTS: feeling a little lightheaded, but otherwise well.  BP has been good.  No BM yet.     MEDICATIONS  (STANDING):  acetaminophen   Tablet .. 1000 milliGRAM(s) Oral every 8 hours  aspirin enteric coated 162 milliGRAM(s) Oral every 12 hours  celecoxib 200 milliGRAM(s) Oral every 12 hours  diltiazem    milliGRAM(s) Oral every 24 hours  docusate sodium 100 milliGRAM(s) Oral three times a day  lactated ringers. 1000 milliLiter(s) (100 mL/Hr) IV Continuous <Continuous>  nicotine -  14 mG/24Hr(s) Patch 1 patch Transdermal daily  pantoprazole    Tablet 40 milliGRAM(s) Oral before breakfast  tamsulosin 0.4 milliGRAM(s) Oral at bedtime    MEDICATIONS  (PRN):  aluminum hydroxide/magnesium hydroxide/simethicone Suspension 30 milliLiter(s) Oral four times a day PRN Indigestion  bisacodyl Suppository 10 milliGRAM(s) Rectal daily PRN If no bowel movement by postoperative day #2  HYDROmorphone  Injectable 0.5 milliGRAM(s) IV Push every 3 hours PRN Severe Pain (7 - 10)  magnesium hydroxide Suspension 30 milliLiter(s) Oral daily PRN Constipation  nicotine - Inhaler 1 Each Inhalation every 3 hours PRN nicotine withdrawal symptoms  ondansetron Injectable 4 milliGRAM(s) IV Push every 6 hours PRN Nausea and/or Vomiting  oxyCODONE    IR 5 milliGRAM(s) Oral every 3 hours PRN Mild Pain (1 - 3)  oxyCODONE    IR 10 milliGRAM(s) Oral every 3 hours PRN Moderate Pain (4 - 6)  polyethylene glycol 3350 17 Gram(s) Oral daily PRN Constipation  senna 2 Tablet(s) Oral at bedtime PRN Constipation      Allergies  No Known Allergies    REVIEW OF SYSTEMS:  CONSTITUTIONAL: No fever, weight loss, or fatigue  EYES: No eye pain, visual disturbances, or discharge  ENMT:  No difficulty hearing, tinnitus, vertigo; No sinus or throat pain  NECK: No pain or stiffness  BREASTS: No pain, masses, or nipple discharge  RESPIRATORY: No cough, wheezing, chills or hemoptysis; No shortness of breath  CARDIOVASCULAR: No chest pain, palpitations, or lightheadedness  GASTROINTESTINAL: No abdominal or epigastric pain. No nausea, vomiting, or hematemesis; No diarrhea or constipation. No melena or hematochezia.  GENITOURINARY: No dysuria, frequency, hematuria, or incontinence  NEUROLOGICAL: No headaches, vertigo, memory loss, loss of strength, numbness, or tremors  SKIN: No itching, burning, rashes, or lesions   LYMPH NODES: No enlarged glands  ENDOCRINE: No heat or cold intolerance; No hair loss; No polydipsia or polyuria  MUSCULOSKELETAL: No back pain  PSYCHIATRIC: No depression, anxiety, or mood swings  HEME/LYMPH: No easy bruising, or bleeding gums  ALLERGY AND IMMUNOLOGIC: No hives or eczema    Vital Signs Last 24 Hrs  T(C): 36.6 (21 Aug 2019 07:37), Max: 37.1 (21 Aug 2019 03:25)  T(F): 97.9 (21 Aug 2019 07:37), Max: 98.7 (21 Aug 2019 03:25)  HR: 80 (21 Aug 2019 07:37) (74 - 90)  BP: 114/60 (21 Aug 2019 10:16) (106/60 - 127/65)  BP(mean): --  RR: 17 (21 Aug 2019 07:37) (17 - 17)  SpO2: 97% (21 Aug 2019 07:37) (95% - 97%)    PHYSICAL EXAM:  GENERAL: NAD, well-groomed, well-developed  HEAD:  Atraumatic, Normocephalic  EYES:  conjunctiva and sclera clear  ENMT: Moist mucous membranes  NECK: Supple, No JVD  NERVOUS SYSTEM:  Alert & Oriented X3, Good concentration; Bilateral LE mobile, sensation to light touch intact  CHEST/LUNG: Clear to auscultation bilaterally; No rales, rhonchi, wheezing, or rubs  HEART: Regular rate and rhythm; No murmurs, rubs, or gallops  ABDOMEN: Soft, Nontender, Nondistended; Bowel sounds present  EXTREMITIES:  2+ Peripheral Pulses, No clubbing or cyanosis  LYMPH: No lymphadenopathy noted  SKIN: No rashes or lesions  INCISION:  Dressing dry and intact    LABS:    21 Aug 2019 08:12    140    |  107    |  12     ----------------------------<  106    4.7     |  29     |  0.93     Ca    8.8        21 Aug 2019 08:12      CAPILLARY BLOOD GLUCOSE      RADIOLOGY & ADDITIONAL TESTS:    Imaging Personally Reviewed:      [ ] Consultant(s) Notes Reviewed  [x] Care Discussed with Consultants/Other Providers:  Ortho PA- plan of care
Procedure: Left TKR 8/19/19  POD #: 2  S: Pt without complaints. No SOB,CP, N/V. Tolerated Diet well.  Pain comfortable (3/10 ) on  Interval Rx.   No BM yet, + flatus, No abdominal pain.  PT activity yesterday:  Walked with walker  Pain Rx:  acetaminophen   Tablet .. 1000 milliGRAM(s) Oral every 8 hours  celecoxib 200 milliGRAM(s) Oral every 12 hours  HYDROmorphone  Injectable 0.5 milliGRAM(s) IV Push every 3 hours PRN  ondansetron Injectable 4 milliGRAM(s) IV Push every 6 hours PRN  oxyCODONE    IR 5 milliGRAM(s) Oral every 3 hours PRN  oxyCODONE    IR 10 milliGRAM(s) Oral every 3 hours PRN    O: General: On exam, No Apparent Distress  Vital Signs Last 24 Hrs  T(C): 36.6 (21 Aug 2019 07:37), Max: 37.1 (21 Aug 2019 03:25)  T(F): 97.9 (21 Aug 2019 07:37), Max: 98.7 (21 Aug 2019 03:25)  HR: 80 (21 Aug 2019 07:37) (74 - 90)  BP: 114/60 (21 Aug 2019 10:16) (114/60 - 127/65)  BP(mean): --  RR: 17 (21 Aug 2019 07:37) (17 - 17)  SpO2: 97% (21 Aug 2019 07:37) (95% - 97%)    Lungs: BS clear bilat.  Heart: RR&R  [Abdomen]: + BS, soft , benign exam     Ext(Knee): Left Knee [ Candelario ] Operative Dressing removed; [Incision] clean, dry, & intact, Prineo intact; no  dehiscence; No  cellulitis; Min effusion [If effusion > soft ]. Mod soft tissue swelling knee with spotty SQ ecchymosis  ROM: Extension 0 deg. ; Flexion 80 deg. PROM in bed.  Neurologic:  Has sensation over feet & toes bilat. Full AROM bilat feet & toes. EHL/AT = 5/5  Vascular: Feet toes warm, pink. DP = 2+. No calf tenderness bilat..    VTEP: On Bilat. Venodynes + aspirin enteric coated 162 milliGRAM(s) Oral every 12 hours    Activity in PT yesterday Noted. [Walked 100 ft. with walker]. [Sat up for 1 hours].  Labs yesterday noted.[Post Op Anemia(min, (tolerated with amb); Chem:  abnormailties if (+)   Hospitalist input noted.  Labs Today:   CBC:                      11.3   11.33 )-----------( 179      ( 20 Aug 2019 07:44 )             34.7     08-21  Chem:  140  |  107  |  12  ----------------------------<  106<H>  4.7   |  29  |  0.93    Primary Orthopedic Assessment:  • Stable from Orthopedic perspective  • Neuro motor exam stable  • Labs: CBC with slight Post-Op Anemia / Chem stable      Plan:   • Continue:  PT/OT/WBAT with assistance of a walker/Ice to knee/ Knee ROM         Incentive spirometry encouraged   • Continue DVT prophylaxis as prescribed, including use of compression devices and ankle pumps  • Continue Pain Rx  • Plans per Medicine   • Discharge planning – anticipated discharge is Home D/C with Home care & Home PT when medically stable & cleared by PT/OT
PULMONARY/CRITICAL CARE      INTERVAL HPI/OVERNIGHT EVENTS:    53y MaleHPI:  Doing well. Mild pain knee. Used oral device.      PAST MEDICAL & SURGICAL HISTORY:  Varicose veins: left leg including left knee  Constipation  Primary osteoarthritis of left knee  Nocturia: x 2-3  Urinary hesitancy  BPH (benign prostatic hyperplasia)  Snoring: wears an oral appliance which improved snoring, no witnessed apneas  Palpitations: on occasion  Atrial fibrillation: 2012, after THR and no recurrence since  History of osteoarthritis  Lumbar pain  Cervical herniated disc  Spinal arthritis  Essential hypertension  H/O cervical spine surgery: cervical laminectomy 2012, unsure levels  S/P appendectomy: 2009  S/P hip replacement: right 2013        ICU Vital Signs Last 24 Hrs  T(C): 36.6 (21 Aug 2019 07:37), Max: 37.1 (21 Aug 2019 03:25)  T(F): 97.9 (21 Aug 2019 07:37), Max: 98.7 (21 Aug 2019 03:25)  HR: 80 (21 Aug 2019 07:37) (74 - 90)  BP: 119/68 (21 Aug 2019 07:37) (114/74 - 127/65)  BP(mean): --  ABP: --  ABP(mean): --  RR: 17 (21 Aug 2019 07:37) (17 - 17)  SpO2: 97% (21 Aug 2019 07:37) (95% - 97%)    Qtts:     I&O's Summary    20 Aug 2019 07:01  -  21 Aug 2019 07:00  --------------------------------------------------------  IN: 0 mL / OUT: 2300 mL / NET: -2300 mL  PHYSICAL EXAM:    GENERAL: NAD, well-groomed, well-developed, NAD  HEAD:  Atraumatic, Normocephalic  EYES: EOMI, PERRLA, conjunctiva and sclera clear  ENMT: No tonsillar erythema, exudates, or enlargement; Moist mucous membranes, Good dentition, No lesions  NECK: Supple, No JVD, Normal thyroid  NERVOUS SYSTEM:  Alert & Oriented X3, Good concentration; Motor Strength 5/5 B/L upper and lower extremities  CHEST/LUNG: Clear to percussion bilaterally; No rales, rhonchi, wheezing, or rubs  HEART: Regular rate and rhythm; No murmurs, rubs, or gallops  ABDOMEN: Soft, Nontender, Nondistended; Bowel sounds present  EXTREMITIES:  2+ Peripheral Pulses, No clubbing, cyanosis, or edema  Left knee bandaged. no calf tenderness  LYMPH: No lymphadenopathy noted  SKIN: No rashes or lesions                    LABS:                        11.3   11.33 )-----------( 179      ( 20 Aug 2019 07:44 )             34.7     08-21    140  |  107  |  12  ----------------------------<  106<H>  4.7   |  29  |  0.93    Ca    8.8      21 Aug 2019 08:12            vanco through     RADIOLOGY & ADDITIONAL STUDIES:      CRITICAL CARE TIME SPENT:

## 2019-08-21 NOTE — PROGRESS NOTE ADULT - ASSESSMENT
53M s/p left TKR
53M s/p left TKR
Pt stable postop left tkr.  Probable MUNA never formerly evaluated, but uses oral device.  Advised followup with me in office to schedule formal sleep study.
Pt stable postop left tkr.  Probable MUNA never formerly evaluated, but uses oral device.  Advised followup with me in office to schedule formal sleep study.

## 2019-08-21 NOTE — PROGRESS NOTE ADULT - PROBLEM SELECTOR PROBLEM 2
MUNA (obstructive sleep apnea)
MUNA (obstructive sleep apnea)
Primary osteoarthritis of left knee
Primary osteoarthritis of left knee

## 2019-08-21 NOTE — PROGRESS NOTE ADULT - PROBLEM SELECTOR PLAN 5
TOV now.  monitor for new signs of obstruction  patient stopped following with , will start flomax and recommend  follow up as outpatient.
observe
passed TOV, continue flomax for now.
observe

## 2019-08-21 NOTE — PROGRESS NOTE ADULT - PROBLEM SELECTOR PLAN 1
Pain Management: acceptable- continue current care Tylenol ATC/Celebrex ATC/ Oxycodone PRN  Continue PT/OT  DVT proph: [ x ] low risk - Aspirin    DC plan:  [x  ] Home with HC
Pain Management: acceptable- continue current care Tylenol ATC/Celebrex ATC/ Oxycodone PRN  Continue PT/OT  DVT proph: [ x ] low risk - Aspirin    DC plan:  [x  ] Home with HC
oral device/nasal O2 here  Sleep study as outpt
oral device/nasal O2 here  Sleep study as outpt

## 2019-08-21 NOTE — PROGRESS NOTE ADULT - PROBLEM SELECTOR PLAN 2
Pulm eval appreciated  needs formal sleep study as outpatient  to see Dr Cox or Dr Chapman as outpatient
Pulm eval appreciated  needs formal sleep study as outpatient  to see Dr Cox or Dr Chapman as outpatient
ambulate  dvt prophylaxis
ambulate  dvt prophylaxis

## 2019-08-21 NOTE — PROGRESS NOTE ADULT - PROBLEM SELECTOR PLAN 3
advised no smoking
no episodes on monitor  continue diltiezem
no episodes on monitor  continue diltiezem
advised no smoking

## 2019-08-29 PROBLEM — Z96.652 STATUS POST TOTAL LEFT KNEE REPLACEMENT: Status: ACTIVE | Noted: 2019-08-29

## 2019-08-30 ENCOUNTER — OTHER (OUTPATIENT)
Age: 54
End: 2019-08-30

## 2019-09-03 ENCOUNTER — APPOINTMENT (OUTPATIENT)
Dept: ORTHOPEDIC SURGERY | Facility: CLINIC | Age: 54
End: 2019-09-03
Payer: COMMERCIAL

## 2019-09-03 VITALS — HEIGHT: 67 IN | SYSTOLIC BLOOD PRESSURE: 116 MMHG | HEART RATE: 74 BPM | DIASTOLIC BLOOD PRESSURE: 72 MMHG

## 2019-09-03 PROCEDURE — 73562 X-RAY EXAM OF KNEE 3: CPT | Mod: LT

## 2019-09-03 PROCEDURE — 99024 POSTOP FOLLOW-UP VISIT: CPT

## 2019-09-03 NOTE — HISTORY OF PRESENT ILLNESS
[___ Weeks Post Op] : [unfilled] weeks post op [Clean/Dry/Intact] : clean, dry and intact [Healed] : healed [Vascular Intact] : ~T peripheral vascular exam normal [Negative Simran's] : maneuvers demonstrated a negative Simran's sign [Xray (Date:___)] : [unfilled] Xray -  [Hardware in Good Position] : hardware in good position [Doing Well] : is doing well [Excellent Pain Control] : has excellent pain control [No Sign of Infection] : is showing no signs of infection [Sutures Removed] : sutures were removed [Steri-Strips Removed & Replaced] : steri-strips removed and replaced [de-identified] : Left total knee replacement 8/19/19 [de-identified] : Patient doing well seems to be having a difficult time with the Tylenolcausing stomach upset. Patient uses an occasional oxycodone, once or twice a day, has eliminated. The Tylenol is taking Celebrexand 2, baby aspirin twice a day [de-identified] : Wounds are well-healed,Calves are non-tender , sensation and pulses are equal at both ankles. Strength against resistance and EHL and dorsiflexion are equal in both lower extremities.\par The alignment is in anatomic neutral. There is no significant mediolateral instability. There is no anterior, posterior instability at 90° there is no flexion contracture. There is no extensor lag.range of motion from full extension. The patient flexing beyond 110° [de-identified] : X-ray of the Left knee was performed in the office today. All installed components appear well fixed to the underlying bone and anatomically aligned. There is a resurfaced patella that is lying centrally in the trochlear groove. [de-identified] : patient doing well. Patient will continue with an exercise programwas given a prescription for outpatient therapy. A prescription for oxycodone, and a prescription for amoxicillin as she should be seen back in 6 weeks' time with Dr. Gil

## 2019-09-04 ENCOUNTER — APPOINTMENT (OUTPATIENT)
Dept: INTERNAL MEDICINE | Facility: CLINIC | Age: 54
End: 2019-09-04
Payer: COMMERCIAL

## 2019-09-04 ENCOUNTER — LABORATORY RESULT (OUTPATIENT)
Age: 54
End: 2019-09-04

## 2019-09-04 VITALS
BODY MASS INDEX: 27.78 KG/M2 | HEART RATE: 77 BPM | HEIGHT: 67 IN | SYSTOLIC BLOOD PRESSURE: 113 MMHG | DIASTOLIC BLOOD PRESSURE: 61 MMHG | WEIGHT: 177 LBS

## 2019-09-04 DIAGNOSIS — Z91.89 OTHER SPECIFIED PERSONAL RISK FACTORS, NOT ELSEWHERE CLASSIFIED: ICD-10-CM

## 2019-09-04 LAB — SAVE SPECIMEN: NORMAL

## 2019-09-04 PROCEDURE — 99214 OFFICE O/P EST MOD 30 MIN: CPT | Mod: 25

## 2019-09-04 PROCEDURE — 36415 COLL VENOUS BLD VENIPUNCTURE: CPT

## 2019-09-04 NOTE — HISTORY OF PRESENT ILLNESS
[Post-hospitalization from ___ Hospital] : Post-hospitalization from [unfilled] Hospital [Admitted on: ___] : The patient was admitted on [unfilled] [Discharge Summary] : discharge summary [Discharged on ___] : discharged on [unfilled] [Pertinent Labs] : pertinent labs [Discharge Med List] : discharge medication list [Med Reconciliation] : medication reconciliation has been completed [Patient Contacted By: ____] : and contacted by [unfilled] [FreeTextEntry2] : s/p Left total knee replacement 8/19/19. noted to have apenic episodes by anesthesia during procedure.  also had urinary retention s/p chanel and flomax.  flomax was not added to his regimen on discharge.  states has h/o elevated PSA s/p bx which was normal.  dxed with BPH.  has nocturia 2-3x night, not debilitating and not interested in taking flomax if not needed.  \par \par walking with cane, recovering very weel after surgery, doing well with PT \par

## 2019-09-04 NOTE — PHYSICAL EXAM
[Pedal Pulses Present] : the pedal pulses are present [No Edema] : there was no peripheral edema [Normal] : no rash [de-identified] : steri strips over left knee.  incision clean/dry/intact.  FROM with mild pain.  no redness or swelling appreciated

## 2019-09-04 NOTE — COUNSELING
[Use proper foot wear] : Use proper foot wear [Fall prevention counseling provided] : Fall prevention counseling provided [Use recommended devices] : Use recommended devices

## 2019-09-04 NOTE — PLAN
[FreeTextEntry1] : s/p Left total knee replacement 8/19/19. \par \par apneic episodes by anesthesia during procedure.  +snoring, daytime fatigue \par -sleep study referral, as MUNA can be underlying factor causing nocturia\par \par urinary retention s/p chanel and flomax while hospitalized.  flomax was not added to his regimen on discharge. h/o elevated PSA s/p bx which was normal.  dxed with BPH. last seen urologist ~5 years ago.  admits to nocturia 2-3x night, not debilitating and not interested in taking flomax if not needed.  \par -urology referral \par -will check PSA now, last done in 12/2018 ~10.  states it had been that level in the past \par \par f/u in 3 months 
PROBLEM DIAGNOSES  Problem: Osteoarthritis of right knee  Assessment and Plan: Right total knee replacement    Problem: Hypertension  Assessment and Plan: Preop medical eval.    Problem: Need for prophylactic measure  Assessment and Plan: Caprini score 7, high VTE risk, SCDs ordered, surgical team to assess need for pharm proph

## 2019-09-06 LAB
ALBUMIN SERPL ELPH-MCNC: 5.1 G/DL
ALP BLD-CCNC: 50 U/L
ALT SERPL-CCNC: 23 U/L
ANION GAP SERPL CALC-SCNC: 13 MMOL/L
AST SERPL-CCNC: 18 U/L
BASOPHILS # BLD AUTO: 0.04 K/UL
BASOPHILS NFR BLD AUTO: 0.5 %
BILIRUB DIRECT SERPL-MCNC: 0.1 MG/DL
BILIRUB INDIRECT SERPL-MCNC: 0.4 MG/DL
BILIRUB SERPL-MCNC: 0.6 MG/DL
BUN SERPL-MCNC: 22 MG/DL
CALCIUM SERPL-MCNC: 10.3 MG/DL
CHLORIDE SERPL-SCNC: 102 MMOL/L
CHOLEST SERPL-MCNC: 179 MG/DL
CHOLEST/HDLC SERPL: 3.8 RATIO
CO2 SERPL-SCNC: 26 MMOL/L
CREAT SERPL-MCNC: 0.9 MG/DL
EOSINOPHIL # BLD AUTO: 0.18 K/UL
EOSINOPHIL NFR BLD AUTO: 2.1 %
GLUCOSE SERPL-MCNC: 84 MG/DL
HCT VFR BLD CALC: 42.6 %
HDLC SERPL-MCNC: 47 MG/DL
HGB BLD-MCNC: 13.5 G/DL
IMM GRANULOCYTES NFR BLD AUTO: 0.3 %
LDLC SERPL CALC-MCNC: 107 MG/DL
LYMPHOCYTES # BLD AUTO: 2.39 K/UL
LYMPHOCYTES NFR BLD AUTO: 27.9 %
MAN DIFF?: NORMAL
MCHC RBC-ENTMCNC: 31.2 PG
MCHC RBC-ENTMCNC: 31.7 GM/DL
MCV RBC AUTO: 98.4 FL
MONOCYTES # BLD AUTO: 0.57 K/UL
MONOCYTES NFR BLD AUTO: 6.6 %
NEUTROPHILS # BLD AUTO: 5.37 K/UL
NEUTROPHILS NFR BLD AUTO: 62.6 %
PLATELET # BLD AUTO: 345 K/UL
POTASSIUM SERPL-SCNC: 5.5 MMOL/L
PROT SERPL-MCNC: 6.9 G/DL
PSA FREE FLD-MCNC: 11 %
PSA FREE SERPL-MCNC: 1.68 NG/ML
PSA SERPL-MCNC: 15.5 NG/ML
RBC # BLD: 4.33 M/UL
RBC # FLD: 14.1 %
SODIUM SERPL-SCNC: 140 MMOL/L
TRIGL SERPL-MCNC: 124 MG/DL
TSH SERPL-ACNC: 1.52 UIU/ML
WBC # FLD AUTO: 8.58 K/UL

## 2019-09-30 ENCOUNTER — APPOINTMENT (OUTPATIENT)
Dept: ORTHOPEDIC SURGERY | Facility: CLINIC | Age: 54
End: 2019-09-30
Payer: COMMERCIAL

## 2019-09-30 VITALS — HEART RATE: 87 BPM | SYSTOLIC BLOOD PRESSURE: 124 MMHG | DIASTOLIC BLOOD PRESSURE: 83 MMHG

## 2019-09-30 PROCEDURE — 99024 POSTOP FOLLOW-UP VISIT: CPT

## 2019-10-29 ENCOUNTER — APPOINTMENT (OUTPATIENT)
Dept: ORTHOPEDIC SURGERY | Facility: CLINIC | Age: 54
End: 2019-10-29
Payer: COMMERCIAL

## 2019-10-29 VITALS
WEIGHT: 178 LBS | DIASTOLIC BLOOD PRESSURE: 79 MMHG | HEIGHT: 67 IN | SYSTOLIC BLOOD PRESSURE: 131 MMHG | HEART RATE: 76 BPM | BODY MASS INDEX: 27.94 KG/M2

## 2019-10-29 DIAGNOSIS — Z96.653 PRESENCE OF ARTIFICIAL KNEE JOINT, BILATERAL: ICD-10-CM

## 2019-10-29 PROCEDURE — 99024 POSTOP FOLLOW-UP VISIT: CPT

## 2019-10-29 PROCEDURE — 73562 X-RAY EXAM OF KNEE 3: CPT | Mod: LT

## 2019-10-29 RX ORDER — OXYCODONE HYDROCHLORIDE 5 MG/1
5 CAPSULE ORAL
Qty: 60 | Refills: 0 | Status: DISCONTINUED | COMMUNITY
Start: 2019-08-30 | End: 2019-10-29

## 2019-10-29 RX ORDER — HYALURONATE SOD, CROSS-LINKED 30 MG/3 ML
30 SYRINGE (ML) INTRAARTICULAR
Qty: 1 | Refills: 0 | Status: DISCONTINUED | OUTPATIENT
Start: 2019-06-25 | End: 2019-10-29

## 2019-10-29 RX ORDER — OXYCODONE 5 MG/1
5 TABLET ORAL
Qty: 30 | Refills: 0 | Status: DISCONTINUED | COMMUNITY
Start: 2019-08-30 | End: 2019-10-29

## 2019-10-29 RX ORDER — OXYCODONE 5 MG/1
5 TABLET ORAL
Qty: 90 | Refills: 0 | Status: DISCONTINUED | COMMUNITY
Start: 2019-09-03 | End: 2019-10-29

## 2020-12-11 ENCOUNTER — APPOINTMENT (OUTPATIENT)
Dept: INTERNAL MEDICINE | Facility: CLINIC | Age: 55
End: 2020-12-11
Payer: COMMERCIAL

## 2020-12-11 ENCOUNTER — NON-APPOINTMENT (OUTPATIENT)
Age: 55
End: 2020-12-11

## 2020-12-11 VITALS
SYSTOLIC BLOOD PRESSURE: 120 MMHG | WEIGHT: 195 LBS | TEMPERATURE: 97.6 F | HEIGHT: 67 IN | BODY MASS INDEX: 30.61 KG/M2 | DIASTOLIC BLOOD PRESSURE: 78 MMHG | HEART RATE: 75 BPM

## 2020-12-11 DIAGNOSIS — M62.838 OTHER MUSCLE SPASM: ICD-10-CM

## 2020-12-11 PROCEDURE — 99072 ADDL SUPL MATRL&STAF TM PHE: CPT

## 2020-12-11 PROCEDURE — 93000 ELECTROCARDIOGRAM COMPLETE: CPT

## 2020-12-11 PROCEDURE — 36415 COLL VENOUS BLD VENIPUNCTURE: CPT

## 2020-12-11 PROCEDURE — 99396 PREV VISIT EST AGE 40-64: CPT | Mod: 25

## 2020-12-11 RX ORDER — DILTIAZEM HYDROCHLORIDE 240 MG/1
240 CAPSULE, EXTENDED RELEASE ORAL
Qty: 90 | Refills: 1 | Status: ACTIVE | COMMUNITY
Start: 2017-08-24

## 2020-12-11 RX ORDER — AMOXICILLIN 500 MG/1
500 CAPSULE ORAL
Qty: 20 | Refills: 4 | Status: DISCONTINUED | COMMUNITY
Start: 2019-09-03 | End: 2020-12-11

## 2020-12-11 RX ORDER — OXYCODONE 5 MG/1
5 TABLET ORAL EVERY 8 HOURS
Qty: 30 | Refills: 0 | Status: DISCONTINUED | COMMUNITY
Start: 2019-09-30 | End: 2020-12-11

## 2020-12-11 RX ORDER — CELECOXIB 200 MG/1
200 CAPSULE ORAL TWICE DAILY
Qty: 60 | Refills: 1 | Status: DISCONTINUED | COMMUNITY
Start: 2019-09-30 | End: 2020-12-11

## 2020-12-11 NOTE — HEALTH RISK ASSESSMENT
[Good] : ~his/her~  mood as  good [] : Yes [Yes] : Yes [Monthly or less (1 pt)] : Monthly or less (1 point) [1 or 2 (0 pts)] : 1 or 2 (0 points) [Never (0 pts)] : Never (0 points) [0] : 2) Feeling down, depressed, or hopeless: Not at all (0) [Patient reported colonoscopy was normal] : Patient reported colonoscopy was normal [] :  [Audit-CScore] : 1 [QHK6Kczce] : 0 [Change in mental status noted] : No change in mental status noted [ColonoscopyDate] : 2016

## 2020-12-11 NOTE — ASSESSMENT
[FreeTextEntry1] : 52 y/o male with h/o Paroxysmal afib on asa 325mg/ccb, osteoarthritis and hyperlipidemia presents for annual\par \par +smoker, recently has started smoking intermittently, currently wearing patch as wants to quit again \par -cessation referral \par -discussed multiple options to quit smoking such as nicotine replacements, medications and group therapy, advised to pick a quit date and strive to achieve complete cessation, informational phone numbers, programs and educational materiel given to patient, stressed importance to quit as to reduce risk of cancer, cardiovascular event and premature death.  \par -Patient is a smoker, smoking cessation was strongly encouraged.  Patient was advised that smoking cessation lowers risks for lung and other types of cancer, reduces the risk of coronary heart disease, stroke and peripheral vascular disease and reduces the risk of developing chronic obstructive pulmonary disease (COPD).\par \par PAF, on cardizem and asa 325mg.  ecgnsr today \par -cont meds as directed   \par \par Hyperlipidemia, seen by cardio, increased cardiazem dose in 1/2020.  \par CT calcium score completed, score -204, h/o HLD hesitant to start meds in the past \par -discussed will need to start statin, side\par -Will check labs \par -Advised decrease greasy, fatty foods, increase exercise, fiber intake \par -Elevated cholesterol has been linked to increase in cardiovascular even such as heart attack, stroke, peripheral artery disease and death\par \par apneic episodes by anesthesia during procedure. +snoring, daytime fatigue \par -sleep study referral, as MUNA can be underlying factor causing nocturia\par \par elevated PSA, still hasn’t seen urology due to pandemic.  s/p bx ~4 years ago \par -will recheck PSA but staets had intercourse <48 hhours ago\par -urology referral \par \par h/o osteoarthtitis. back spasms \par -flexeril as needed \par \par Annual \par -Advised to get yearly Flu shot -refused \par -Advised Yearly Eye exam with Ophthalmologist\par -Advised Yearly Dental exam\par -Educated of the importance of Healthy diet, such as Mediterranean Diet and Exercise, such as walking >20 minutes a day and increasing gradually as tolerated\par \par Preventative screening \par -advised to get colonoscopy for colon cancer screening -UTD, repeat in 1 year\par -will check PSA for prostate cancer screening -labs drawn \par \par f/u in 3 months \par \par \par \par \par \par

## 2020-12-11 NOTE — HISTORY OF PRESENT ILLNESS
[de-identified] : 52 y/o male with h/o Paroxysmal afib on asa 325mg/ccb, osteoarthritis and hyperlipidemia presents for annual\par \par recently has started smoking intermittently, currently wearing patch as wants to quit again \par \par seen by cardio, increased cardiazem dose in 1/2020.  \par CT calcium score completed, score -204, h/o HLD hesitant to start meds in the past \par \par h/o elevated PSA, still hasn’t seen urology due to pandemic\par

## 2020-12-11 NOTE — PHYSICAL EXAM
[Pedal Pulses Present] : the pedal pulses are present [No Edema] : there was no peripheral edema [Normal Posterior Cervical Nodes] : no posterior cervical lymphadenopathy [Normal Anterior Cervical Nodes] : no anterior cervical lymphadenopathy [Normal] : normal gait, coordination grossly intact, no focal deficits and deep tendon reflexes were 2+ and symmetric

## 2020-12-11 NOTE — COUNSELING
[Risk of tobacco use and health benefits of smoking cessation discussed] : Risk of tobacco use and health benefits of smoking cessation discussed [Cessation strategies including cessation program discussed] : Cessation strategies including cessation program discussed [Use of nicotine replacement therapies and other medications discussed] : Use of nicotine replacement therapies and other medications discussed [Encouraged to pick a quit date and identify support needed to quit] : Encouraged to pick a quit date and identify support needed to quit [Willing to Quit Smoking] : Willing to quit smoking [Participate in Class] : Participate in class [Encouraged to increase physical activity] : Encouraged to increase physical activity [Encouraged to use exercise tracking device] : Encouraged to use exercise tracking device [Decrease Portions] : decrease portions [____ min/wk Activity] : [unfilled] min/wk activity

## 2020-12-14 LAB
25(OH)D3 SERPL-MCNC: 32.3 NG/ML
ALBUMIN SERPL ELPH-MCNC: 4.6 G/DL
ALP BLD-CCNC: 52 U/L
ALT SERPL-CCNC: 21 U/L
ANION GAP SERPL CALC-SCNC: 13 MMOL/L
APPEARANCE: CLEAR
AST SERPL-CCNC: 18 U/L
BACTERIA: NEGATIVE
BASOPHILS # BLD AUTO: 0.04 K/UL
BASOPHILS NFR BLD AUTO: 0.5 %
BILIRUB DIRECT SERPL-MCNC: 0.1 MG/DL
BILIRUB INDIRECT SERPL-MCNC: 0.2 MG/DL
BILIRUB SERPL-MCNC: 0.3 MG/DL
BILIRUBIN URINE: NEGATIVE
BLOOD URINE: NEGATIVE
BUN SERPL-MCNC: 15 MG/DL
CALCIUM SERPL-MCNC: 9.4 MG/DL
CHLORIDE SERPL-SCNC: 104 MMOL/L
CHOLEST SERPL-MCNC: 192 MG/DL
CO2 SERPL-SCNC: 24 MMOL/L
COLOR: YELLOW
CREAT SERPL-MCNC: 0.89 MG/DL
EOSINOPHIL # BLD AUTO: 0.08 K/UL
EOSINOPHIL NFR BLD AUTO: 1.1 %
ESTIMATED AVERAGE GLUCOSE: 108 MG/DL
FERRITIN SERPL-MCNC: 139 NG/ML
FOLATE SERPL-MCNC: >20 NG/ML
GLUCOSE QUALITATIVE U: NEGATIVE
GLUCOSE SERPL-MCNC: 94 MG/DL
HBA1C MFR BLD HPLC: 5.4 %
HCT VFR BLD CALC: 44.2 %
HDLC SERPL-MCNC: 46 MG/DL
HGB BLD-MCNC: 14.5 G/DL
HYALINE CASTS: 0 /LPF
IMM GRANULOCYTES NFR BLD AUTO: 0.1 %
IRON SATN MFR SERPL: 36 %
IRON SERPL-MCNC: 99 UG/DL
KETONES URINE: NEGATIVE
LDLC SERPL CALC-MCNC: 126 MG/DL
LEUKOCYTE ESTERASE URINE: NEGATIVE
LYMPHOCYTES # BLD AUTO: 3.04 K/UL
LYMPHOCYTES NFR BLD AUTO: 41.5 %
MAN DIFF?: NORMAL
MCHC RBC-ENTMCNC: 31.1 PG
MCHC RBC-ENTMCNC: 32.8 GM/DL
MCV RBC AUTO: 94.8 FL
MICROSCOPIC-UA: NORMAL
MONOCYTES # BLD AUTO: 0.47 K/UL
MONOCYTES NFR BLD AUTO: 6.4 %
NEUTROPHILS # BLD AUTO: 3.68 K/UL
NEUTROPHILS NFR BLD AUTO: 50.4 %
NITRITE URINE: NEGATIVE
NONHDLC SERPL-MCNC: 145 MG/DL
PH URINE: 6
PLATELET # BLD AUTO: 224 K/UL
POTASSIUM SERPL-SCNC: 4.4 MMOL/L
PROT SERPL-MCNC: 7 G/DL
PROTEIN URINE: NEGATIVE
PSA SERPL-MCNC: 11.3 NG/ML
RBC # BLD: 4.66 M/UL
RBC # FLD: 12.6 %
RED BLOOD CELLS URINE: 2 /HPF
SAVE SPECIMEN: NORMAL
SODIUM SERPL-SCNC: 141 MMOL/L
SPECIFIC GRAVITY URINE: 1.02
SQUAMOUS EPITHELIAL CELLS: 0 /HPF
TIBC SERPL-MCNC: 274 UG/DL
TRIGL SERPL-MCNC: 96 MG/DL
TSH SERPL-ACNC: 0.96 UIU/ML
UIBC SERPL-MCNC: 174 UG/DL
UROBILINOGEN URINE: NORMAL
VIT B12 SERPL-MCNC: 793 PG/ML
WBC # FLD AUTO: 7.32 K/UL
WHITE BLOOD CELLS URINE: 1 /HPF

## 2021-01-13 ENCOUNTER — APPOINTMENT (OUTPATIENT)
Dept: UROLOGY | Facility: CLINIC | Age: 56
End: 2021-01-13
Payer: COMMERCIAL

## 2021-01-13 VITALS
HEIGHT: 67 IN | HEART RATE: 71 BPM | BODY MASS INDEX: 30.29 KG/M2 | DIASTOLIC BLOOD PRESSURE: 79 MMHG | WEIGHT: 193 LBS | RESPIRATION RATE: 17 BRPM | SYSTOLIC BLOOD PRESSURE: 136 MMHG | TEMPERATURE: 97.9 F

## 2021-01-13 PROCEDURE — 88112 CYTOPATH CELL ENHANCE TECH: CPT | Mod: 26

## 2021-01-13 PROCEDURE — 99214 OFFICE O/P EST MOD 30 MIN: CPT

## 2021-01-13 PROCEDURE — 99072 ADDL SUPL MATRL&STAF TM PHE: CPT

## 2021-01-17 LAB
APPEARANCE: CLEAR
BACTERIA UR CULT: NORMAL
BACTERIA: NEGATIVE
BILIRUBIN URINE: NEGATIVE
BLOOD URINE: NEGATIVE
COLOR: YELLOW
GLUCOSE QUALITATIVE U: NEGATIVE
HYALINE CASTS: 0 /LPF
KETONES URINE: NORMAL
LEUKOCYTE ESTERASE URINE: NEGATIVE
MICROSCOPIC-UA: NORMAL
NITRITE URINE: NEGATIVE
PH URINE: 6
PROTEIN URINE: NORMAL
PSA FREE FLD-MCNC: 15 %
PSA FREE SERPL-MCNC: 1.78 NG/ML
PSA SERPL-MCNC: 12 NG/ML
RED BLOOD CELLS URINE: 2 /HPF
SPECIFIC GRAVITY URINE: 1.03
SQUAMOUS EPITHELIAL CELLS: 0 /HPF
URINE CYTOLOGY: NORMAL
UROBILINOGEN URINE: NORMAL
WHITE BLOOD CELLS URINE: 1 /HPF

## 2021-01-18 NOTE — LETTER HEADER
[FreeTextEntry3] : Ramiro Yee MD, PhD\par [facility]\par [address]\par [address] [place] [address]\[place] [address]

## 2021-01-18 NOTE — ADDENDUM
[FreeTextEntry1] : I, Mabel Chuin, acted solely as a scribe for Dr. Ramiro Yee on this date 01/13/2021.\par \par All medical record entries made by the Scribe were at my, Dr. Ramiro Yee, direction and personally dictated by me on 01/13/2021. I have reviewed the chart and agree that the record accurately reflects my personal performance of the history, physical exam, assessment and plan.  I have also personally directed, reviewed and agreed with the chart.

## 2021-01-18 NOTE — ASSESSMENT
[FreeTextEntry1] : 01/13/2021: Mr. Perez is a 56y/o M presenting today for evaluation of elevated PSA. H/o BPH. Previously saw Dr. Finnegan at [place]. Had one episode of gross hematuria that has resolved. 12/11/20 PSA was 11.3, which is decreased from 15.50 on 9/4/19. Wakes up 2-3x at night to urinate but does not need medication. Denies urinary urgency, pushing or straining. Urinates 5-6x during the day to urinate. Sexual function is good. Some constipation and takes Metamucil. No asthma, diabetes, or seizures. PSHx of neck operation 4 years ago and hip operation. Left hand weakness due to neck pain. No FHx of prostate ca or kidney stones.  with two children. Current smoker of one pack a day for 35 years, but trying to quit. \par \par Digital rectal exam found no suspicious rectal masses. No rectal mucosal lesions. Anal tone is normal. The prostate is non tender, with normal texture, discrete borders, and no nodules. It is a 40 gram transurethral resection size. No gross blood on examining fingers. \par \par Will send for prostate MRI. No pacemaker or claustrophobia. Has had MRIs in the past without issue. \par \par Blood work today included PSA. \par The patient produced a urine sample which will be sent for urinalysis, urine cytology, and urine culture. \par \par RTO 4-5 days after MRI to review months.

## 2021-01-18 NOTE — PHYSICAL EXAM
[General Appearance - Well Developed] : well developed [Normal Appearance] : normal appearance [General Appearance - In No Acute Distress] : no acute distress [Edema] : no peripheral edema [Exaggerated Use Of Accessory Muscles For Inspiration] : no accessory muscle use [Respiration, Rhythm And Depth] : normal respiratory rhythm and effort [Abdomen Soft] : soft [Abdomen Tenderness] : non-tender [Normal Station and Gait] : the gait and station were normal for the patient's age [] : no rash [No Focal Deficits] : no focal deficits [Oriented To Time, Place, And Person] : oriented to person, place, and time [Affect] : the affect was normal [Mood] : the mood was normal [Not Anxious] : not anxious [Heart Rate And Rhythm] : Heart rate and rhythm were normal [Abdomen Mass (___ Cm)] : no abdominal mass palpated [Costovertebral Angle Tenderness] : no ~M costovertebral angle tenderness [Urethral Meatus] : meatus normal [Penis Abnormality] : normal circumcised penis [Epididymis] : the epididymides were normal [Testes Tenderness] : no tenderness of the testes [Testes Mass (___cm)] : there were no testicular masses [Cervical Lymph Nodes Enlarged Posterior Bilaterally] : posterior cervical [Cervical Lymph Nodes Enlarged Anterior Bilaterally] : anterior cervical [Inguinal Lymph Nodes Enlarged Bilaterally] : inguinal [FreeTextEntry1] : No submandibular gland tenderness.

## 2021-01-18 NOTE — ASSESSMENT
[FreeTextEntry1] : 01/13/2021: Mr. Perez is a 54y/o M presenting today for evaluation of elevated PSA. H/o BPH. Previously saw Dr. Finnegan at [place]. Had one episode of gross hematuria that has resolved. 12/11/20 PSA was 11.3, which is decreased from 15.50 on 9/4/19. Wakes up 2-3x at night to urinate but does not need medication. Denies urinary urgency, pushing or straining. Urinates 5-6x during the day to urinate. Sexual function is good. Some constipation and takes Metamucil. No asthma, diabetes, or seizures. PSHx of neck operation 4 years ago and hip operation. Left hand weakness due to neck pain. No FHx of prostate ca or kidney stones.  with two children. Current smoker of one pack a day for 35 years, but trying to quit. \par \par Digital rectal exam found no suspicious rectal masses. No rectal mucosal lesions. Anal tone is normal. The prostate is non tender, with normal texture, discrete borders, and no nodules. It is a 40 gram transurethral resection size. No gross blood on examining fingers. \par \par Will send for prostate MRI. No pacemaker or claustrophobia. Has had MRIs in the past without issue. \par \par Blood work today included PSA. \par The patient produced a urine sample which will be sent for urinalysis, urine cytology, and urine culture. \par \par RTO 4-5 days after MRI to review months.

## 2021-01-18 NOTE — REVIEW OF SYSTEMS
[Joint Pain] : joint pain [Negative] : Heme/Lymph [Constipation] : constipation [FreeTextEntry2] : Frequent urination

## 2021-01-18 NOTE — LETTER HEADER
[FreeTextEntry3] : Ramiro Yee MD, PhD\par [facility]\par [address]\par [address] [place] [address]\[place] [address]

## 2021-01-18 NOTE — LETTER BODY
[Dear  ___] : Dear  [unfilled], [Consult Letter:] : I had the pleasure of evaluating your patient, [unfilled]. [Please see my note below.] : Please see my note below. [Consult Closing:] : Thank you very much for allowing me to participate in the care of this patient.  If you have any questions, please do not hesitate to contact me. [Sincerely,] : Sincerely, [FreeTextEntry2] : Dr. Ciro Wagoner\par [address] [facility] [address]\[place] [address] [FreeTextEntry1] : 01/13/2021: Mr. Perez is a 56y/o M presenting today for evaluation of elevated PSA. H/o BPH. Previously saw Dr. Finnegan at [place]. Had one episode of gross hematuria that has resolved. 12/11/20 PSA was 11.3, which is decreased from 15.50 on 9/4/19. Wakes up 2-3x at night to urinate but does not need medication. Denies urinary urgency, pushing or straining. Urinates 5-6x during the day to urinate. Sexual function is good. Some constipation and takes Metamucil. No asthma, diabetes, or seizures. PSHx of neck operation 4 years ago and hip operation. Left hand weakness due to neck pain. No FHx of prostate ca or kidney stones.  with two children. Current smoker of one pack a day for 35 years, but trying to quit. \par \par Digital rectal exam found no suspicious rectal masses. No rectal mucosal lesions. Anal tone is normal. The prostate is non tender, with normal texture, discrete borders, and no nodules. It is a 40 gram transurethral resection size. No gross blood on examining fingers. \par \par Will send for prostate MRI. No pacemaker or claustrophobia. Has had MRIs in the past without issue. \par \par Blood work today included PSA. \par The patient produced a urine sample which will be sent for urinalysis, urine cytology, and urine culture. \par \par RTO 4-5 days after MRI to review months.  [FreeTextEntry3] : Ramiro Yee MD, PhD

## 2021-01-18 NOTE — LETTER BODY
[Dear  ___] : Dear  [unfilled], [Consult Letter:] : I had the pleasure of evaluating your patient, [unfilled]. [Please see my note below.] : Please see my note below. [Consult Closing:] : Thank you very much for allowing me to participate in the care of this patient.  If you have any questions, please do not hesitate to contact me. [Sincerely,] : Sincerely, [FreeTextEntry2] : Dr. Ciro Wagoner\par [address] [facility] [address]\[place] [address] [FreeTextEntry1] : 01/13/2021: Mr. Perez is a 56y/o M presenting today for evaluation of elevated PSA. H/o BPH. Previously saw Dr. Finnegan at [place]. Had one episode of gross hematuria that has resolved. 12/11/20 PSA was 11.3, which is decreased from 15.50 on 9/4/19. Wakes up 2-3x at night to urinate but does not need medication. Denies urinary urgency, pushing or straining. Urinates 5-6x during the day to urinate. Sexual function is good. Some constipation and takes Metamucil. No asthma, diabetes, or seizures. PSHx of neck operation 4 years ago and hip operation. Left hand weakness due to neck pain. No FHx of prostate ca or kidney stones.  with two children. Current smoker of one pack a day for 35 years, but trying to quit. \par \par Digital rectal exam found no suspicious rectal masses. No rectal mucosal lesions. Anal tone is normal. The prostate is non tender, with normal texture, discrete borders, and no nodules. It is a 40 gram transurethral resection size. No gross blood on examining fingers. \par \par Will send for prostate MRI. No pacemaker or claustrophobia. Has had MRIs in the past without issue. \par \par Blood work today included PSA. \par The patient produced a urine sample which will be sent for urinalysis, urine cytology, and urine culture. \par \par RTO 4-5 days after MRI to review months.  [FreeTextEntry3] : Ramiro Yee MD, PhD

## 2021-02-13 ENCOUNTER — RESULT REVIEW (OUTPATIENT)
Age: 56
End: 2021-02-13

## 2021-02-13 ENCOUNTER — APPOINTMENT (OUTPATIENT)
Dept: MRI IMAGING | Facility: IMAGING CENTER | Age: 56
End: 2021-02-13
Payer: COMMERCIAL

## 2021-02-13 ENCOUNTER — OUTPATIENT (OUTPATIENT)
Dept: OUTPATIENT SERVICES | Facility: HOSPITAL | Age: 56
LOS: 1 days | End: 2021-02-13
Payer: COMMERCIAL

## 2021-02-13 DIAGNOSIS — Z96.60 PRESENCE OF UNSPECIFIED ORTHOPEDIC JOINT IMPLANT: Chronic | ICD-10-CM

## 2021-02-13 DIAGNOSIS — R97.20 ELEVATED PROSTATE SPECIFIC ANTIGEN [PSA]: ICD-10-CM

## 2021-02-13 DIAGNOSIS — Z98.89 OTHER SPECIFIED POSTPROCEDURAL STATES: Chronic | ICD-10-CM

## 2021-02-13 DIAGNOSIS — Z00.8 ENCOUNTER FOR OTHER GENERAL EXAMINATION: ICD-10-CM

## 2021-02-13 DIAGNOSIS — Z98.890 OTHER SPECIFIED POSTPROCEDURAL STATES: Chronic | ICD-10-CM

## 2021-02-13 PROCEDURE — 72197 MRI PELVIS W/O & W/DYE: CPT

## 2021-02-13 PROCEDURE — 76377 3D RENDER W/INTRP POSTPROCES: CPT

## 2021-02-13 PROCEDURE — 72197 MRI PELVIS W/O & W/DYE: CPT | Mod: 26

## 2021-02-13 PROCEDURE — A9585: CPT

## 2021-02-13 PROCEDURE — 76377 3D RENDER W/INTRP POSTPROCES: CPT | Mod: 26

## 2021-04-01 ENCOUNTER — APPOINTMENT (OUTPATIENT)
Dept: INTERNAL MEDICINE | Facility: CLINIC | Age: 56
End: 2021-04-01
Payer: COMMERCIAL

## 2021-04-01 VITALS
HEIGHT: 67 IN | WEIGHT: 194 LBS | BODY MASS INDEX: 30.45 KG/M2 | SYSTOLIC BLOOD PRESSURE: 109 MMHG | TEMPERATURE: 97.3 F | HEART RATE: 75 BPM | DIASTOLIC BLOOD PRESSURE: 64 MMHG

## 2021-04-01 PROCEDURE — 99214 OFFICE O/P EST MOD 30 MIN: CPT | Mod: 25

## 2021-04-01 PROCEDURE — 99072 ADDL SUPL MATRL&STAF TM PHE: CPT

## 2021-04-01 PROCEDURE — 36415 COLL VENOUS BLD VENIPUNCTURE: CPT

## 2021-04-01 RX ORDER — CYCLOBENZAPRINE HYDROCHLORIDE 5 MG/1
5 TABLET, FILM COATED ORAL
Qty: 21 | Refills: 0 | Status: DISCONTINUED | COMMUNITY
Start: 2020-12-11 | End: 2021-04-01

## 2021-04-01 RX ORDER — ASPIRIN 325 MG/1
325 TABLET, FILM COATED ORAL
Qty: 90 | Refills: 3 | Status: ACTIVE | COMMUNITY

## 2021-04-01 NOTE — ASSESSMENT
[FreeTextEntry1] : 54 y/o male with h/o Paroxysmal afib on asa 325mg/ccb, osteoarthritis and hyperlipidemia presents for \par \par CAD, hyperlipidemia.  Improved LDL in 3/2021. CT calcium score completed, score -204, h/o HLD, currently on statin and aspirin 81 mg daily.  Cholesterol levels improved last month done by cardiologist.  Was advised to decrease dose at that time.\par -Given elevated calcium score would recommend staying on atorvastatin 40 mg daily to further minimize his cardiovascular risk\par -Educated of the importance of Healthy diet, such as Mediterranean Diet and Exercise, such as walking >20 minutes a day and increasing gradually as tolerated\par \par Paroxysmal afib on asa 325mg/ccb\par -cont meds as directed \par \par h/o elevated PSA, was recently seen by urologist.  Rectal exam-normal.  MRI prostate-normal.  Still having nocturia, several times a night.  Discussed starting Flomax.  Was reluctant at that time but open to it at this time\par -Start Flomax, risks and benefits of medication discussed in detail.\par \par Nicotine dependence, still having one cig in the morning, but greatly reduced after starting nicotine replacements.\par -Congratulated and advised to refrain completely\par \par \par Noted to have hyperkalemia-5.5 on labs done last month with cardiology.  \par -Repeat level today\par \par -Received [employer] Covid vaccine last month.\par -discussed shingles vaccine (shingrix), advised to verify with insurance if its covered through medical or prescription plan\par \par \par f/u in 3 months

## 2021-04-01 NOTE — HISTORY OF PRESENT ILLNESS
[de-identified] : 54 y/o male with h/o Paroxysmal afib on asa 325mg/ccb, osteoarthritis and hyperlipidemia presents for \par \par CT calcium score completed, score -204, h/o HLD, currently on statin and aspirin 81 mg daily.  Cholesterol levels improved last month done by cardiologist.  Was advised to decrease dose at that time.\par \par h/o elevated PSA, was recently seen by urologist.  Rectal exam-normal.  MRI prostate-normal.  Still having nocturia, several times a night.  Discussed starting Flomax.  Was reluctant at that time but open to it at this time\par \par still having one cig in the morning, but greatly reduced after starting nicotine replacements.\par \par \par Received [employer] Covid vaccine last month.\par \par Noted to have hyperkalemia-5.5 on labs done last month with cardiology.

## 2021-04-02 LAB
ALBUMIN SERPL ELPH-MCNC: 4.8 G/DL
ALP BLD-CCNC: 54 U/L
ALT SERPL-CCNC: 27 U/L
ANION GAP SERPL CALC-SCNC: 14 MMOL/L
AST SERPL-CCNC: 21 U/L
BASOPHILS # BLD AUTO: 0.04 K/UL
BASOPHILS NFR BLD AUTO: 0.4 %
BILIRUB DIRECT SERPL-MCNC: 0.1 MG/DL
BILIRUB INDIRECT SERPL-MCNC: 0.2 MG/DL
BILIRUB SERPL-MCNC: 0.4 MG/DL
BUN SERPL-MCNC: 17 MG/DL
CALCIUM SERPL-MCNC: 9.8 MG/DL
CHLORIDE SERPL-SCNC: 104 MMOL/L
CO2 SERPL-SCNC: 25 MMOL/L
CREAT SERPL-MCNC: 0.89 MG/DL
EOSINOPHIL # BLD AUTO: 0.12 K/UL
EOSINOPHIL NFR BLD AUTO: 1.3 %
FOLATE SERPL-MCNC: >20 NG/ML
GLUCOSE SERPL-MCNC: 92 MG/DL
HCT VFR BLD CALC: 47.4 %
HGB BLD-MCNC: 15.3 G/DL
IMM GRANULOCYTES NFR BLD AUTO: 0.1 %
LYMPHOCYTES # BLD AUTO: 2.98 K/UL
LYMPHOCYTES NFR BLD AUTO: 33.3 %
MAGNESIUM SERPL-MCNC: 2.2 MG/DL
MAN DIFF?: NORMAL
MCHC RBC-ENTMCNC: 31.4 PG
MCHC RBC-ENTMCNC: 32.3 GM/DL
MCV RBC AUTO: 97.3 FL
MONOCYTES # BLD AUTO: 0.49 K/UL
MONOCYTES NFR BLD AUTO: 5.5 %
NEUTROPHILS # BLD AUTO: 5.32 K/UL
NEUTROPHILS NFR BLD AUTO: 59.4 %
PLATELET # BLD AUTO: 232 K/UL
POTASSIUM SERPL-SCNC: 5.3 MMOL/L
PROT SERPL-MCNC: 7.2 G/DL
RBC # BLD: 4.87 M/UL
RBC # FLD: 12.5 %
SODIUM SERPL-SCNC: 143 MMOL/L
VIT B12 SERPL-MCNC: 817 PG/ML
WBC # FLD AUTO: 8.96 K/UL

## 2021-06-20 NOTE — DISCHARGE NOTE PROVIDER - NSDCCPTREATMENT_GEN_ALL_CORE_FT
PRINCIPAL PROCEDURE  Procedure: Left total knee replacement  Findings and Treatment: end stage DJD warm

## 2021-06-23 ENCOUNTER — RX RENEWAL (OUTPATIENT)
Age: 56
End: 2021-06-23

## 2021-07-30 ENCOUNTER — LABORATORY RESULT (OUTPATIENT)
Age: 56
End: 2021-07-30

## 2021-07-30 ENCOUNTER — APPOINTMENT (OUTPATIENT)
Dept: INTERNAL MEDICINE | Facility: CLINIC | Age: 56
End: 2021-07-30
Payer: COMMERCIAL

## 2021-07-30 VITALS
DIASTOLIC BLOOD PRESSURE: 76 MMHG | OXYGEN SATURATION: 96 % | WEIGHT: 190 LBS | SYSTOLIC BLOOD PRESSURE: 119 MMHG | HEIGHT: 67 IN | HEART RATE: 76 BPM | BODY MASS INDEX: 29.82 KG/M2

## 2021-07-30 DIAGNOSIS — M77.10 LATERAL EPICONDYLITIS, UNSPECIFIED ELBOW: ICD-10-CM

## 2021-07-30 PROCEDURE — 36415 COLL VENOUS BLD VENIPUNCTURE: CPT

## 2021-07-30 PROCEDURE — 99214 OFFICE O/P EST MOD 30 MIN: CPT | Mod: 25

## 2021-07-30 RX ORDER — TAMSULOSIN HYDROCHLORIDE 0.4 MG/1
0.4 CAPSULE ORAL
Qty: 90 | Refills: 0 | Status: DISCONTINUED | COMMUNITY
Start: 2021-04-01 | End: 2021-07-30

## 2021-07-30 NOTE — HISTORY OF PRESENT ILLNESS
[de-identified] : 56 y/o male with h/o Paroxysmal afib on asa 325mg/ccb, osteoarthritis and hyperlipidemia presents for follow-up \par \par h/o elevated PSA, was recently seen by urologist.  Rectal exam-normal.  MRI prostate-normal.  Still having nocturia, several times a night.  unable to toelrate Flomax.  needs to f/u with urology\par \par still having one cig in the morning, but greatly reduced after starting nicotine replacements.  breathing better \par \par Has been complaining of right sided elbow pain.  Worse during movement.  Denies any swelling, redness or trauma.  Works as \par

## 2021-07-30 NOTE — HISTORY OF PRESENT ILLNESS
[FreeTextEntry1] : 01/13/2021: Mr. Perez is a 56y/o M presenting today for evaluation of elevated PSA. H/o BPH. Previously saw Dr. Finnegan at [place]. Had one episode of gross hematuria that has resolved. 12/11/20 PSA was 11.3, which is decreased from 15.50 on 9/4/19. Wakes up 2-3x at night to urinate but does not need medication. Denies urinary urgency, pushing or straining. Urinates 5-6x during the day to urinate. Sexual function is good. Some constipation and takes Metamucil. No asthma, diabetes, or seizures. PSHx of neck operation 4 years ago and hip operation. Left hand weakness due to neck pain. No FHx of prostate ca or kidney stones.  with two children. Current smoker of one pack a day for 35 years, but trying to quit.  [de-identified] : 56 y/o male with h/o Paroxysmal afib on asa 325mg/ccb, osteoarthritis and hyperlipidemia presents for \par \par CT calcium score completed, score -204, h/o HLD, currently on statin and aspirin 81 mg daily.  Cholesterol levels improved last month done by cardiologist.  Was advised to decrease dose at that time.\par \par h/o elevated PSA, was recently seen by urologist.  Rectal exam-normal.  MRI prostate-normal.  Still having nocturia, several times a night.  Discussed starting Flomax.  Was reluctant at that time but open to it at this time.  \par \par still having one cig in the morning, but greatly reduced after starting nicotine replacements.\par \par Received [employer] Covid vaccine last month.\par \par Noted to have hyperkalemia-5.5 on labs done last month with cardiology.  \par

## 2021-07-30 NOTE — HISTORY OF PRESENT ILLNESS
[FreeTextEntry1] : 01/13/2021: Mr. Perez is a 54y/o M presenting today for evaluation of elevated PSA. H/o BPH. Previously saw Dr. Finnegan at [place]. Had one episode of gross hematuria that has resolved. 12/11/20 PSA was 11.3, which is decreased from 15.50 on 9/4/19. Wakes up 2-3x at night to urinate but does not need medication. Denies urinary urgency, pushing or straining. Urinates 5-6x during the day to urinate. Sexual function is good. Some constipation and takes Metamucil. No asthma, diabetes, or seizures. PSHx of neck operation 4 years ago and hip operation. Left hand weakness due to neck pain. No FHx of prostate ca or kidney stones.  with two children. Current smoker of one pack a day for 35 years, but trying to quit.  [de-identified] : 54 y/o male with h/o Paroxysmal afib on asa 325mg/ccb, osteoarthritis and hyperlipidemia presents for \par \par CT calcium score completed, score -204, h/o HLD, currently on statin and aspirin 81 mg daily.  Cholesterol levels improved last month done by cardiologist.  Was advised to decrease dose at that time.\par \par h/o elevated PSA, was recently seen by urologist.  Rectal exam-normal.  MRI prostate-normal.  Still having nocturia, several times a night.  Discussed starting Flomax.  Was reluctant at that time but open to it at this time.  \par \par still having one cig in the morning, but greatly reduced after starting nicotine replacements.\par \par Received [employer] & [employer] Covid vaccine last month.\par \par Noted to have hyperkalemia-5.5 on labs done last month with cardiology.  \par

## 2021-07-30 NOTE — ASSESSMENT
[FreeTextEntry1] : 56 y/o male with h/o Paroxysmal afib on asa 325mg/ccb, osteoarthritis and hyperlipidemia presents for \par \par CAD, hyperlipidemia.  Improved LDL in 3/2021. CT calcium score completed, score -204, h/o HLD, currently on statin and aspirin 81 mg daily.  Cholesterol levels improved last month done by cardiologist.  Was advised to decrease dose at that time.\par -cont atorvastatin 40 mg daily to further minimize his cardiovascular risk\par -Educated of the importance of Healthy diet, such as Mediterranean Diet and Exercise, such as walking >20 minutes a day and increasing gradually as tolerated\par \par Paroxysmal afib on asa 325mg/ccb\par -cont meds as directed \par \par h/o elevated PSA, was recently seen by urologist.  Rectal exam-normal.  MRI prostate-normal.  Still having nocturia, several times a night.  Unable to tolerate Flomax\par -Advised to follow-up with urology for possible alternative as Flomax was helping with nocturia\par \par Nicotine dependence, still having one cig in the morning, but greatly reduced after starting nicotine replacements.\par -Congratulated and advised to refrain completely\par \par Lateral epicondylitis\par -Anti-inflammatories as directed\par -Elbow strap as needed\par -If continues will need orthopedic evaluation\par \par -discussed shingles vaccine (shingrix), advised to verify with insurance if its covered through medical or prescription plan\par \par f/u in 3 months

## 2021-07-30 NOTE — PHYSICAL EXAM
[Pedal Pulses Present] : the pedal pulses are present [No Edema] : there was no peripheral edema [Normal Posterior Cervical Nodes] : no posterior cervical lymphadenopathy [Normal Anterior Cervical Nodes] : no anterior cervical lymphadenopathy [Normal] : normal gait, coordination grossly intact, no focal deficits and deep tendon reflexes were 2+ and symmetric [de-identified] : Mild tenderness over the lateral epicondyle

## 2021-08-03 LAB
ALBUMIN SERPL ELPH-MCNC: 4.6 G/DL
ALP BLD-CCNC: 57 U/L
ALT SERPL-CCNC: 25 U/L
ANION GAP SERPL CALC-SCNC: 12 MMOL/L
APPEARANCE: CLEAR
AST SERPL-CCNC: 21 U/L
BACTERIA: NEGATIVE
BASOPHILS # BLD AUTO: 0.03 K/UL
BASOPHILS NFR BLD AUTO: 0.4 %
BILIRUB DIRECT SERPL-MCNC: 0.1 MG/DL
BILIRUB INDIRECT SERPL-MCNC: 0.2 MG/DL
BILIRUB SERPL-MCNC: 0.3 MG/DL
BILIRUBIN URINE: NEGATIVE
BLOOD URINE: NEGATIVE
BUN SERPL-MCNC: 14 MG/DL
CALCIUM SERPL-MCNC: 9.6 MG/DL
CHLORIDE SERPL-SCNC: 107 MMOL/L
CHOLEST SERPL-MCNC: 114 MG/DL
CO2 SERPL-SCNC: 25 MMOL/L
COLOR: YELLOW
CREAT SERPL-MCNC: 0.88 MG/DL
EOSINOPHIL # BLD AUTO: 0.13 K/UL
EOSINOPHIL NFR BLD AUTO: 1.8 %
ESTIMATED AVERAGE GLUCOSE: 111 MG/DL
FOLATE SERPL-MCNC: 19.2 NG/ML
GLUCOSE QUALITATIVE U: NEGATIVE
GLUCOSE SERPL-MCNC: 96 MG/DL
HBA1C MFR BLD HPLC: 5.5 %
HCT VFR BLD CALC: 44.3 %
HDLC SERPL-MCNC: 47 MG/DL
HGB BLD-MCNC: 15 G/DL
HYALINE CASTS: 1 /LPF
IMM GRANULOCYTES NFR BLD AUTO: 0.1 %
KETONES URINE: NEGATIVE
LDLC SERPL CALC-MCNC: 56 MG/DL
LEUKOCYTE ESTERASE URINE: NEGATIVE
LYMPHOCYTES # BLD AUTO: 2.39 K/UL
LYMPHOCYTES NFR BLD AUTO: 32.3 %
MAN DIFF?: NORMAL
MCHC RBC-ENTMCNC: 31.5 PG
MCHC RBC-ENTMCNC: 33.9 GM/DL
MCV RBC AUTO: 93.1 FL
MICROSCOPIC-UA: NORMAL
MONOCYTES # BLD AUTO: 0.44 K/UL
MONOCYTES NFR BLD AUTO: 5.9 %
NEUTROPHILS # BLD AUTO: 4.4 K/UL
NEUTROPHILS NFR BLD AUTO: 59.5 %
NITRITE URINE: NEGATIVE
NONHDLC SERPL-MCNC: 67 MG/DL
PH URINE: 6
PLATELET # BLD AUTO: 220 K/UL
POTASSIUM SERPL-SCNC: 4.8 MMOL/L
PROT SERPL-MCNC: 6.9 G/DL
PROTEIN URINE: NORMAL
PSA SERPL-MCNC: 9.83 NG/ML
RBC # BLD: 4.76 M/UL
RBC # FLD: 12.8 %
RED BLOOD CELLS URINE: 2 /HPF
SODIUM SERPL-SCNC: 144 MMOL/L
SPECIFIC GRAVITY URINE: 1.03
SQUAMOUS EPITHELIAL CELLS: 0 /HPF
TRIGL SERPL-MCNC: 59 MG/DL
TSH SERPL-ACNC: 0.78 UIU/ML
UROBILINOGEN URINE: NORMAL
VIT B12 SERPL-MCNC: 833 PG/ML
WBC # FLD AUTO: 7.4 K/UL
WHITE BLOOD CELLS URINE: 1 /HPF

## 2021-11-01 ENCOUNTER — APPOINTMENT (OUTPATIENT)
Dept: INTERNAL MEDICINE | Facility: CLINIC | Age: 56
End: 2021-11-01
Payer: COMMERCIAL

## 2021-11-01 VITALS
WEIGHT: 190 LBS | HEART RATE: 80 BPM | HEIGHT: 67 IN | SYSTOLIC BLOOD PRESSURE: 123 MMHG | BODY MASS INDEX: 29.82 KG/M2 | DIASTOLIC BLOOD PRESSURE: 83 MMHG | OXYGEN SATURATION: 96 %

## 2021-11-01 PROCEDURE — 99214 OFFICE O/P EST MOD 30 MIN: CPT | Mod: 25

## 2021-11-01 PROCEDURE — 36415 COLL VENOUS BLD VENIPUNCTURE: CPT

## 2021-11-01 RX ORDER — DICLOFENAC SODIUM 1% 10 MG/G
1 GEL TOPICAL DAILY
Qty: 1 | Refills: 0 | Status: DISCONTINUED | COMMUNITY
Start: 2021-07-30 | End: 2021-11-01

## 2021-11-01 NOTE — ASSESSMENT
[FreeTextEntry1] : 57 y/o male with h/o Paroxysmal afib on asa 325mg/ccb, osteoarthritis and hyperlipidemia presents for \par \par CAD, hyperlipidemia.  Improved LDL in 3/2021. CT calcium score completed, score -204, h/o HLD, currently on statin and aspirin 81 mg daily.  Cholesterol levels improved last month done by cardiologist.  Was advised to decrease dose at that time.\par -cont atorvastatin 40 mg daily to further minimize his cardiovascular risk\par -Educated of the importance of Healthy diet, such as Mediterranean Diet and Exercise, such as walking >20 minutes a day and increasing gradually as tolerated\par \par Paroxysmal afib on asa 325mg/ccb\par -cont meds as directed \par \par h/o elevated PSA, was recently seen by urologist.  Rectal exam-normal.  MRI prostate-normal.  Still having nocturia, several times a night.  Unable to tolerate Flomax\par -Advised to follow-up with urology for possible alternative as Flomax was helping with nocturia\par \par Nicotine dependence, still having one cig in the morning, but greatly reduced after starting nicotine replacements.\par -Congratulated and advised to refrain completely\par \par Lateral epicondylitis-stable \par -Anti-inflammatories as directed\par -Elbow strap as needed\par -If continues will need orthopedic evaluation\par \par -discussed shingles vaccine (shingrix), advised to verify with insurance if its covered through medical or prescription plan\par \par f/u in 3 months

## 2021-11-01 NOTE — PHYSICAL EXAM
[Pedal Pulses Present] : the pedal pulses are present [No Edema] : there was no peripheral edema [Normal Posterior Cervical Nodes] : no posterior cervical lymphadenopathy [Normal Anterior Cervical Nodes] : no anterior cervical lymphadenopathy [Normal] : normal gait, coordination grossly intact, no focal deficits and deep tendon reflexes were 2+ and symmetric [de-identified] : Mild tenderness over the lateral epicondyle

## 2021-11-01 NOTE — HISTORY OF PRESENT ILLNESS
[de-identified] : 55 y/o male with h/o Paroxysmal afib on asa 325mg/ccb, osteoarthritis and hyperlipidemia presents for follow-up \par \par still having one cig in the morning, but greatly reduced after starting nicotine replacements.  breathing better \par \par compliant with meds, no SE\par

## 2021-11-02 LAB
ALBUMIN SERPL ELPH-MCNC: 4.8 G/DL
ALP BLD-CCNC: 60 U/L
ALT SERPL-CCNC: 24 U/L
ANION GAP SERPL CALC-SCNC: 12 MMOL/L
AST SERPL-CCNC: 20 U/L
BASOPHILS # BLD AUTO: 0.04 K/UL
BASOPHILS NFR BLD AUTO: 0.5 %
BILIRUB DIRECT SERPL-MCNC: 0.1 MG/DL
BILIRUB INDIRECT SERPL-MCNC: 0.3 MG/DL
BILIRUB SERPL-MCNC: 0.4 MG/DL
BUN SERPL-MCNC: 18 MG/DL
CALCIUM SERPL-MCNC: 9.6 MG/DL
CHLORIDE SERPL-SCNC: 104 MMOL/L
CHOLEST SERPL-MCNC: 148 MG/DL
CO2 SERPL-SCNC: 25 MMOL/L
CREAT SERPL-MCNC: 0.9 MG/DL
EOSINOPHIL # BLD AUTO: 0.1 K/UL
EOSINOPHIL NFR BLD AUTO: 1.3 %
ESTIMATED AVERAGE GLUCOSE: 114 MG/DL
FOLATE SERPL-MCNC: >20 NG/ML
GLUCOSE SERPL-MCNC: 93 MG/DL
HBA1C MFR BLD HPLC: 5.6 %
HCT VFR BLD CALC: 47 %
HDLC SERPL-MCNC: 53 MG/DL
HGB BLD-MCNC: 15.3 G/DL
IMM GRANULOCYTES NFR BLD AUTO: 0.1 %
LDLC SERPL CALC-MCNC: 80 MG/DL
LYMPHOCYTES # BLD AUTO: 1.69 K/UL
LYMPHOCYTES NFR BLD AUTO: 21.2 %
MAN DIFF?: NORMAL
MCHC RBC-ENTMCNC: 31.5 PG
MCHC RBC-ENTMCNC: 32.6 GM/DL
MCV RBC AUTO: 96.7 FL
MONOCYTES # BLD AUTO: 0.51 K/UL
MONOCYTES NFR BLD AUTO: 6.4 %
NEUTROPHILS # BLD AUTO: 5.62 K/UL
NEUTROPHILS NFR BLD AUTO: 70.5 %
NONHDLC SERPL-MCNC: 96 MG/DL
PLATELET # BLD AUTO: 213 K/UL
POTASSIUM SERPL-SCNC: 4.9 MMOL/L
PROT SERPL-MCNC: 7.2 G/DL
RBC # BLD: 4.86 M/UL
RBC # FLD: 12.9 %
SODIUM SERPL-SCNC: 141 MMOL/L
TRIGL SERPL-MCNC: 80 MG/DL
TSH SERPL-ACNC: 1 UIU/ML
VIT B12 SERPL-MCNC: 771 PG/ML
WBC # FLD AUTO: 7.97 K/UL

## 2021-11-03 ENCOUNTER — APPOINTMENT (OUTPATIENT)
Dept: UROLOGY | Facility: CLINIC | Age: 56
End: 2021-11-03
Payer: COMMERCIAL

## 2021-11-03 PROCEDURE — 99214 OFFICE O/P EST MOD 30 MIN: CPT

## 2021-11-07 LAB
APPEARANCE: CLEAR
BACTERIA UR CULT: NORMAL
BACTERIA: NEGATIVE
BILIRUBIN URINE: NEGATIVE
BLOOD URINE: NEGATIVE
COLOR: YELLOW
GLUCOSE QUALITATIVE U: NEGATIVE
HYALINE CASTS: 0 /LPF
KETONES URINE: NEGATIVE
LEUKOCYTE ESTERASE URINE: NEGATIVE
MICROSCOPIC-UA: NORMAL
NITRITE URINE: NEGATIVE
PH URINE: 6
PROTEIN URINE: NORMAL
PSA FREE FLD-MCNC: 14 %
PSA FREE SERPL-MCNC: 1.52 NG/ML
PSA SERPL-MCNC: 11.1 NG/ML
RED BLOOD CELLS URINE: 2 /HPF
SPECIFIC GRAVITY URINE: 1.03
SQUAMOUS EPITHELIAL CELLS: 0 /HPF
TESTOST BND SERPL-MCNC: 7.5 PG/ML
TESTOSTERONE TOTAL S: 226 NG/DL
URINE CYTOLOGY: NORMAL
UROBILINOGEN URINE: ABNORMAL
WHITE BLOOD CELLS URINE: 1 /HPF

## 2021-11-07 NOTE — HISTORY OF PRESENT ILLNESS
[FreeTextEntry1] : 01/13/2021: Mr. Perez is a 54y/o M presenting today for evaluation of elevated PSA. H/o BPH. Previously saw Dr. Finnegan at [place]. Had one episode of gross hematuria that has resolved. 12/11/20 PSA was 11.3, which is decreased from 15.50 on 9/4/19. Wakes up 2-3x at night to urinate but does not need medication. Denies urinary urgency, pushing or straining. Urinates 5-6x during the day to urinate. Sexual function is good. Some constipation and takes Metamucil. No asthma, diabetes, or seizures. PSHx of neck operation 4 years ago and hip operation. Left hand weakness due to neck pain. No FHx of prostate ca or kidney stones.  with two children. Current smoker of one pack a day for 35 years, but trying to quit. \par \par 11/03/2021: Patient presents today for follow up. Urination is unchanged. Urinates 10-15x during the day. Drinks about 2 quarts of fluids a day. Drinks 1 cup of green tea a day. Wakes 2-4x at inght, although unsure if it is due to urination or sleep apnea which he think she may have. Had prostate MRI 2/13/21 for elevated PSA which was read as PIRADS 2, 173 cc prostate gland. Has some urinary hesitancy after waking up, otherwise normal throughout the day. No urinary urgency but urinates more often when he has caffeine. Had noticed one time where he urinated one drop urine, but had it after the biopsy which was negative. Had tried Tamsulosin the past, but did not feel well on it with side effects of headache, stomach ache, and difficulty sleeping. States he possibly has sleep apnea. Admits feeling tired during the day. Has had hip and knee surgery. Has tried taking half of marijuana gummy before bed after which he only woke up once. Had palpitations when he took a whole one. Erections are good. Will be going to [place] next week.

## 2021-11-07 NOTE — ASSESSMENT
[FreeTextEntry1] : 01/13/2021: Mr. Perez is a 55y/o M presenting today for evaluation of elevated PSA. H/o BPH. Previously saw Dr. Finnegan at [place]. Had one episode of gross hematuria that has resolved. 12/11/20 PSA was 11.3, which is decreased from 15.50 on 9/4/19. Wakes up 2-3x at night to urinate but does not need medication. Denies urinary urgency, pushing or straining. Urinates 5-6x during the day to urinate. Sexual function is good. Some constipation and takes Metamucil. No asthma, diabetes, or seizures. PSHx of neck operation 4 years ago and hip operation. Left hand weakness due to neck pain. No FHx of prostate ca or kidney stones.  with two children. Current smoker of one pack a day for 35 years, but trying to quit. \par \par 11/03/2021: Patient presents today for follow up. Urination is unchanged. Urinates 10-15x during the day. Drinks about 2 quarts of fluids a day. Drinks 1 cup of green tea a day. Wakes 2-4x at inght, although unsure if it is due to urination or sleep apnea which he think she may have. Had prostate MRI 2/13/21 for elevated PSA which was read as PIRADS 2, 173 cc prostate gland. Has some urinary hesitancy after waking up, otherwise normal throughout the day. No urinary urgency but urinates more often when he has caffeine. Had noticed one time where he urinated one drop urine, but had it after the biopsy which was negative. Had tried Tamsulosin the past, but did not feel well on it with side effects of headache, stomach ache, and difficulty sleeping. States he possibly has sleep apnea. Admits feeling tired during the day. Has had hip and knee surgery. Has tried taking half of marijuana gummy before bed after which he only woke up once. Had palpitations when he took a whole one. Erections are good. Will be going to [place] next week. \par \par Digital rectal exam found no suspicious rectal masses. No rectal mucosal lesions. Anal tone is normal. The prostate is non tender, with normal texture, discrete borders, and no nodules that I could feel. Could only feel up to about the midgland. It is over 100 gram transurethral resection size. No gross blood on examining fingers. \par \par I prescribed the patient Finasteride 5 mg to take one tablet daily for BPH symptoms. I informed the patient there may be erectile dysfunction, decreased libido, breast tenderness, and hot flashes as a side effect. I explained that there is a 25% reduction of prostate cancer. \par \par I referred the patient to pulmonologist Dr. Peet Galvin for sleep study to see if he may need CPAP machine. \par \par Blood work today included BMP, alkaline phosphatase, PSA, and testosterone. \par The patient produced a urine sample which will be sent for urinalysis, urine cytology, and urine culture. \par \par RTO in 3 months for follow up or sooner if new urinary symptoms develop. \par \par Preparation, in-person office visit, and coordination of care took: 30 minutes

## 2021-11-07 NOTE — REVIEW OF SYSTEMS
[Constipation] : constipation [Joint Pain] : joint pain [Negative] : Heme/Lymph [FreeTextEntry2] : Frequent urination None known

## 2021-11-07 NOTE — ADDENDUM
[FreeTextEntry1] : I, Mabel Chuin, acted solely as a scribe for Dr. Ramiro Yee on this date 11/03/2021.\par \par All medical record entries made by the Scribe were at my, Dr. Ramiro Yee, direction and personally dictated by me on 11/03/2021. I have reviewed the chart and agree that the record accurately reflects my personal performance of the history, physical exam, assessment and plan.  I have also personally directed, reviewed and agreed with the chart.

## 2021-11-07 NOTE — PHYSICAL EXAM
[General Appearance - Well Developed] : well developed [Normal Appearance] : normal appearance [General Appearance - In No Acute Distress] : no acute distress [Abdomen Soft] : soft [Abdomen Tenderness] : non-tender [Edema] : no peripheral edema [] : no respiratory distress [Respiration, Rhythm And Depth] : normal respiratory rhythm and effort [Exaggerated Use Of Accessory Muscles For Inspiration] : no accessory muscle use [Oriented To Time, Place, And Person] : oriented to person, place, and time [Affect] : the affect was normal [Mood] : the mood was normal [Not Anxious] : not anxious [Normal Station and Gait] : the gait and station were normal for the patient's age [No Focal Deficits] : no focal deficits [FreeTextEntry1] : Digital rectal exam found no suspicious rectal masses. No rectal mucosal lesions. Anal tone is normal. The prostate is non tender, with normal texture, discrete borders, and no nodules that I could feel. Could only feel up to about the midgland. It is over 100 gram transurethral resection size. No gross blood on examining fingers.

## 2022-02-08 ENCOUNTER — NON-APPOINTMENT (OUTPATIENT)
Age: 57
End: 2022-02-08

## 2022-02-08 ENCOUNTER — APPOINTMENT (OUTPATIENT)
Dept: INTERNAL MEDICINE | Facility: CLINIC | Age: 57
End: 2022-02-08
Payer: COMMERCIAL

## 2022-02-08 VITALS
DIASTOLIC BLOOD PRESSURE: 70 MMHG | BODY MASS INDEX: 29.83 KG/M2 | SYSTOLIC BLOOD PRESSURE: 108 MMHG | OXYGEN SATURATION: 96 % | HEART RATE: 77 BPM | WEIGHT: 185.6 LBS | HEIGHT: 66.14 IN

## 2022-02-08 DIAGNOSIS — M25.511 PAIN IN RIGHT SHOULDER: ICD-10-CM

## 2022-02-08 PROCEDURE — 36415 COLL VENOUS BLD VENIPUNCTURE: CPT

## 2022-02-08 PROCEDURE — 93000 ELECTROCARDIOGRAM COMPLETE: CPT

## 2022-02-08 PROCEDURE — 99396 PREV VISIT EST AGE 40-64: CPT | Mod: 25

## 2022-02-08 NOTE — HEALTH RISK ASSESSMENT
[Good] : ~his/her~  mood as  good [No] : In the past 12 months have you used drugs other than those required for medical reasons? No [No falls in past year] : Patient reported no falls in the past year [0] : 2) Feeling down, depressed, or hopeless: Not at all (0) [PHQ-2 Negative - No further assessment needed] : PHQ-2 Negative - No further assessment needed [Patient reported colonoscopy was normal] : Patient reported colonoscopy was normal [] :  [Fully functional (bathing, dressing, toileting, transferring, walking, feeding)] : Fully functional (bathing, dressing, toileting, transferring, walking, feeding) [Fully functional (using the telephone, shopping, preparing meals, housekeeping, doing laundry, using] : Fully functional and needs no help or supervision to perform IADLs (using the telephone, shopping, preparing meals, housekeeping, doing laundry, using transportation, managing medications and managing finances) [[id number]] : 0 [Change in mental status noted] : No change in mental status noted [ColonoscopyDate] : 2016

## 2022-02-08 NOTE — PHYSICAL EXAM
[Pedal Pulses Present] : the pedal pulses are present [No Edema] : there was no peripheral edema [Normal Posterior Cervical Nodes] : no posterior cervical lymphadenopathy [Normal Anterior Cervical Nodes] : no anterior cervical lymphadenopathy [Normal] : normal gait, coordination grossly intact, no focal deficits and deep tendon reflexes were 2+ and symmetric [de-identified] : Mild tenderness over the lateral epicondyle

## 2022-02-08 NOTE — ASSESSMENT
[FreeTextEntry1] : 55 y/o male with h/o Paroxysmal afib on asa 325mg/ccb, osteoarthritis and hyperlipidemia presents for annual exam\par \par CAD, hyperlipidemia.  Improved LDL in 3/2021. CT calcium score completed, score -204, h/o HLD, currently on statin and aspirin 81 mg daily.  Cholesterol levels improved last month done by cardiologist.  Was advised to decrease dose at that time.\par -cont atorvastatin 40 mg daily to further minimize his cardiovascular risk\par -Educated of the importance of Healthy diet, such as Mediterranean Diet and Exercise, such as walking >20 minutes a day and increasing gradually as tolerated\par \par Paroxysmal afib on asa 325mg/ccb.  Having less frequently however episodes of which he monitors on his apple watch.  \par -Advised to follow-up with cardiology for possible Holter monitor, Zio patch to quantify frequency of runs of A. fib.  If less due to better diet, decreased nicotine may try to reduce dose of Cardizem\par -cont meds as directed \par \par h/o elevated PSA, was recently seen by urologist.  Rectal exam-normal.  MRI prostate-normal.  Still having nocturia, several times a night.  Unable to tolerate Flomax\par -Seen by urology, started on finasteride several months ago.-Monitor\par \par Nicotine dependence, still having one cig in the morning, but greatly reduced after starting nicotine replacements.\par -Congratulated and advised to refrain completely\par \par Right shoulder pain, likely tendinitis\par -orthopedic evaluation\par \par Annual \par -Advise to get yearly Flu shot\par -Advise Yearly Eye exam with Ophthalmologist\par -Advise Yearly Dental exam\par -Educated of the importance of Healthy diet, such as Mediterranean Diet and Exercise, such as walking >20 minutes a day and increasing gradually as tolerated\par \par Preventative screening \par -advised to get colonoscopy for colon cancer screening-advised due for repeat\par -will check PSA for prostate cancer screening-labs drawn\par \par -covid -up to date\par -discussed shingles vaccine (shingrix), advised to verify with insurance if its covered through medical or prescription plan\par \par \par f/u in 3 months

## 2022-02-08 NOTE — HISTORY OF PRESENT ILLNESS
[de-identified] : 55 y/o male with h/o Paroxysmal afib on asa 325mg/ccb, osteoarthritis and hyperlipidemia presents for annual exam.  \par \par still having one cig in the morning, but greatly reduced after starting nicotine replacements.  breathing better.  \par \par compliant with meds, no SE\par \par Has been watching his diet.  Endorses less sugary drinks and adding sugar to his coffee.  Noted weight loss.\par \par Having less frequently however episodes of which he monitors on his apple watch.  \par \par

## 2022-02-09 ENCOUNTER — CLINICAL ADVICE (OUTPATIENT)
Age: 57
End: 2022-02-09

## 2022-02-09 LAB
25(OH)D3 SERPL-MCNC: 33.5 NG/ML
ALBUMIN SERPL ELPH-MCNC: 4.9 G/DL
ALP BLD-CCNC: 54 U/L
ALT SERPL-CCNC: 29 U/L
ANION GAP SERPL CALC-SCNC: 13 MMOL/L
APPEARANCE: CLEAR
AST SERPL-CCNC: 23 U/L
BACTERIA: NEGATIVE
BASOPHILS # BLD AUTO: 0.03 K/UL
BASOPHILS NFR BLD AUTO: 0.4 %
BILIRUB DIRECT SERPL-MCNC: 0.1 MG/DL
BILIRUB INDIRECT SERPL-MCNC: 0.3 MG/DL
BILIRUB SERPL-MCNC: 0.5 MG/DL
BILIRUBIN URINE: NEGATIVE
BLOOD URINE: NEGATIVE
BUN SERPL-MCNC: 15 MG/DL
CALCIUM SERPL-MCNC: 9.5 MG/DL
CHLORIDE SERPL-SCNC: 104 MMOL/L
CO2 SERPL-SCNC: 24 MMOL/L
COLOR: YELLOW
CREAT SERPL-MCNC: 0.88 MG/DL
EOSINOPHIL # BLD AUTO: 0.13 K/UL
EOSINOPHIL NFR BLD AUTO: 1.8 %
ESTIMATED AVERAGE GLUCOSE: 114 MG/DL
FERRITIN SERPL-MCNC: 120 NG/ML
FOLATE SERPL-MCNC: >20 NG/ML
GLUCOSE QUALITATIVE U: NEGATIVE
GLUCOSE SERPL-MCNC: 94 MG/DL
HBA1C MFR BLD HPLC: 5.6 %
HCT VFR BLD CALC: 47.3 %
HGB BLD-MCNC: 15.1 G/DL
HYALINE CASTS: 0 /LPF
IMM GRANULOCYTES NFR BLD AUTO: 0.3 %
IRON SATN MFR SERPL: 42 %
IRON SERPL-MCNC: 114 UG/DL
KETONES URINE: NEGATIVE
LEUKOCYTE ESTERASE URINE: NEGATIVE
LYMPHOCYTES # BLD AUTO: 2.31 K/UL
LYMPHOCYTES NFR BLD AUTO: 31.9 %
MAN DIFF?: NORMAL
MCHC RBC-ENTMCNC: 30.9 PG
MCHC RBC-ENTMCNC: 31.9 GM/DL
MCV RBC AUTO: 96.7 FL
MICROSCOPIC-UA: NORMAL
MONOCYTES # BLD AUTO: 0.54 K/UL
MONOCYTES NFR BLD AUTO: 7.4 %
NEUTROPHILS # BLD AUTO: 4.22 K/UL
NEUTROPHILS NFR BLD AUTO: 58.2 %
NITRITE URINE: NEGATIVE
PH URINE: 6
PLATELET # BLD AUTO: 217 K/UL
POTASSIUM SERPL-SCNC: 4.2 MMOL/L
PROT SERPL-MCNC: 7 G/DL
PROTEIN URINE: NORMAL
RBC # BLD: 4.89 M/UL
RBC # FLD: 12.6 %
RED BLOOD CELLS URINE: 2 /HPF
SODIUM SERPL-SCNC: 141 MMOL/L
SPECIFIC GRAVITY URINE: 1.02
SQUAMOUS EPITHELIAL CELLS: 0 /HPF
TIBC SERPL-MCNC: 269 UG/DL
TSH SERPL-ACNC: 1.33 UIU/ML
UIBC SERPL-MCNC: 156 UG/DL
UROBILINOGEN URINE: NORMAL
VIT B12 SERPL-MCNC: 836 PG/ML
WBC # FLD AUTO: 7.25 K/UL
WHITE BLOOD CELLS URINE: 0 /HPF

## 2022-02-15 ENCOUNTER — APPOINTMENT (OUTPATIENT)
Dept: ORTHOPEDIC SURGERY | Facility: CLINIC | Age: 57
End: 2022-02-15
Payer: COMMERCIAL

## 2022-02-15 ENCOUNTER — NON-APPOINTMENT (OUTPATIENT)
Age: 57
End: 2022-02-15

## 2022-02-15 VITALS
BODY MASS INDEX: 29.73 KG/M2 | HEART RATE: 78 BPM | WEIGHT: 185 LBS | HEIGHT: 66.14 IN | SYSTOLIC BLOOD PRESSURE: 121 MMHG | OXYGEN SATURATION: 96 % | DIASTOLIC BLOOD PRESSURE: 73 MMHG

## 2022-02-15 DIAGNOSIS — M75.81 OTHER SHOULDER LESIONS, RIGHT SHOULDER: ICD-10-CM

## 2022-02-15 DIAGNOSIS — M75.41 IMPINGEMENT SYNDROME OF RIGHT SHOULDER: ICD-10-CM

## 2022-02-15 DIAGNOSIS — M75.101 UNSPECIFIED ROTATOR CUFF TEAR OR RUPTURE OF RIGHT SHOULDER, NOT SPECIFIED AS TRAUMATIC: ICD-10-CM

## 2022-02-15 PROCEDURE — 73030 X-RAY EXAM OF SHOULDER: CPT | Mod: RT

## 2022-02-15 PROCEDURE — 20611 DRAIN/INJ JOINT/BURSA W/US: CPT | Mod: RT

## 2022-02-15 PROCEDURE — 99214 OFFICE O/P EST MOD 30 MIN: CPT | Mod: 25

## 2022-02-15 NOTE — DISCUSSION/SUMMARY
[de-identified] : Right shoulder rotator cuff syndrome, impingement syndrome\par \par I discussed my findings on history, exam and radiology.\par \par I reviewed the anatomy and function of the shoulder rotator cuff muscles and tendons, biceps tendon and labrum. Given the current findings for the patient, I recommend proceeding with non-operative management of the shoulder consisting of the following:\par \par Patient education about the shoulder motions causing pain and possibly injury for activity modification\par \par Ice or warm compress\par \par Physical therapy prescription with shoulder rotator cuff strengthening, jamie-scapular stabilization, ROM stretching, mobilization, modalities, HEP\par \par The patient was prescribed Diclofenac PO non-steroidal anti-inflammatory medication. 50mg tablets twice daily to be taken for at least 1-2 weeks in a row and then PRN afterwards. Risks and benefits were discussed and include but not limited to renal damage and GI ulceration and bleeding.  They were advised to take with food to limit stomach upset as well as warned to stop the medication if worsening gastric pain or dizziness or other side effects. Also to immediately stop the medication and seek appropriate medical attention if any severe stomach ache, gastritis, black/red vomit, black/red stools or any other medical concern.\par \par Procedure Note:\par \par Verbal consent was obtained for bursal corticosteroid injection of the RIGHT shoulder subacromial bursa, after the risks and benefits were discussed with the patient. Potential adverse effects were discussed including but not limited to bleeding, skin/ joint infection, local skin reactions including bleaching, bruising, stiffness, soreness, vasovagal episodes, transient hyperglycemia, avascular necrosis, pseudo-septic type reactions, post injection joint pain, allergic reaction to product or anesthetic and other rare but potential adverse effects along with benefits including decreased pain and improved stability prior to obtaining verbal informed consent. It was also discussed that for some patients the treatment is ineffective and there are no guarantees that the patient will experience improvement as the result of the injection. In rare occasions the injection can cause worsening of pain.\par \par The use of a Sonosite 15-6 MHz linear transducer with live ultrasound guidance of the shoulder was necessary given the patient's BMI and local body habitus overlying and obscuring the identification of normal body bony anatomy used to identify the injection site, the depth of soft tissue envelope necessitating a longer than normal needle to reach the bursal space, and to confirm the location of the needle tip within the bursa and localize delivery of the medication. Without the use of live ultrasound guidance the injection would have been more difficult to appropriate place and ensure accurate delivery of medication and place the patient's anatomy structures at risk for injury.\par \par The arm was placed in an adducted and external rotated position and the arm supported by a bump. The posterolateral injection site was identified using the ultrasound probe longitudianlly to identify the acromion boarder, the tuberosity, the rotator cuff tendons and the bursal space with internal and external rotation of the arm. The injection site was marked and prepped with a ChloraPrep swab and anesthetized with ethylchloride skin anesthesia. Using sterile technique a 22g 1 1/2 in spinal needle with 3 cc total of 1cc 1% lidocaine without epinephrine, 1cc 0.25% Marcaine without epinephrine and 1cc of 40mg/mL Kenalog was passed through the injection site towards the previously identified bursal space under live ultrasound guidance and noted to tip to be located in the appropriate plane. The medication was injected without resistance under live ultrasound visualization and noted to flow into the bursal space with its expansion noted on live ultrasound. The injection site was sterilely dressed, there was minimal blood loss. The patient tolerated this procedure without any complications done by myself. Images were recorded and saved.\par \par The patient has been advised that if they notice any worsening of symptoms or any problems to contact me and seek care from a qualified medical professional. The patient was instructed to ice the shoulder and take NSAID medication on an as needed basis if the patient feels discomfort.\par \par \par \par \par The patient verifies their understanding the the visit, diagnosis and plan. They agree with the treatment plan and will contact the office with any questions or problems.\par \par FU after PT completion if unimproved

## 2022-02-15 NOTE — HISTORY OF PRESENT ILLNESS
[de-identified] : Chief Complaint: R shoulder pain\par \par HPI: MYRNA The patient is a 56 year yo [RHD male presents today for gradual onset of 4-5 months activity related pain to the lateral right shoulder without injury.  \par he says the pain is same, rating the pain a 6-7 out of 10, and describes the character of the pain as sharp, stabbing, without radiation. \par he reports associated symptoms of strain/pull with heavy lifting. \par he reports rest makes the pain better and abduction and IR makes the pain worse.\par He + pain affecting sleep at night and report pre-existing neck pain. he denies report prior similar pain. oprior left shoulder pain not similar, resolved w csi\par \par Patient works as a \par Patient on aspirin for Afib\par pNot a diabetic\par MYRNA has tried the following treatments:\par Activity Modification	-     \par Ice			-\par NSAIDs			-\par Physical Therapy		-\par Cortisone Injection		-\par Arthroscopy		-\par \par Consult by: NATHANIEL AVALOS

## 2022-02-15 NOTE — PHYSICAL EXAM
[de-identified] : Physical Examination\par General: well nourished, in no acute distress, alert and oriented to person, place and time\par Psychiatric: normal mood and affect, no abnormal movements or speech patterns\par Eyes: vision intact [-] glasses\par \par Musculoskeletal Examination\par Cervical spine	Full painless range of motion and negative Spurling's test\par \par Shoulder			Right			Left\par Appearance\par      Skin/Swelling/Deformity	normal			normal\par      Scapular Winging		-			-\par Range of Motion\par      Forward Flexion		170 / 170		170 / 170\par      Abduction			170 / 170		170 / 170\par      External Rotation		45			45\par      Internal Rotation		T10			T10\par      SAbd Ext Rotation		90			90\par      SAbd Int Rotation		80			80\par      Painful Arc			+			-\par      Crepitus			-			-\par Palpation\par      Clavicle			-			-\par      AC Joint			-			-\par      Posterior Acromion		-			-\par      Levator Scapula		-			-\par      Lateral Bursa			+			-\par      Impingement Area		-			-\par      Biceps Tendon		-			-\par      Anterior Capsule		-			-\par Strength Examination\par      Supraspinatous 		5+ / +			5+ / 0\par      Infraspinatous			5- / +			5+ / 0\par      Subscapularis			5+ / 0			5+ / 0\par      Belly Press			5+ / 0			5+ / 0\par      Lift Off			-			-\par      Drop-Arm			-			-\par Special Examination\par      Biceps Gillespie's		-			-\par      Impingement Neer		+			-\par      Impingement Hawking		+			-\par \par \par Sensation\par      Axillary			normal			normal\par      LatAntCubBrach 		normal			normal\par      Median 			normal			normal\par      Ulnar 			normal			normal\par      Radial 			normal			normal\par Motor\par      AIN 				normal			normal\par      Ulnar 			normal			normal\par      Radial 			normal			normal\par      PIN 				normal			normal\par Pulses\par      Radial			2+			2+ [de-identified] : 4 views of the affected Right shoulder (AP, Glenoid, Y-View, Axillary)\par were ordered, obtained and evaluated by myself today and\par demonstrate:\par normal bony calcification without dislocation and no fracture\par 	Arch	2B\par 	AC Joint	no Arthrosis\par 	GH Joint	no Arthrosis\par 	Calcifications	trace calcification round. Tuberosity footprint

## 2022-04-12 ENCOUNTER — APPOINTMENT (OUTPATIENT)
Dept: ORTHOPEDIC SURGERY | Facility: CLINIC | Age: 57
End: 2022-04-12

## 2022-04-26 ENCOUNTER — RX RENEWAL (OUTPATIENT)
Age: 57
End: 2022-04-26

## 2022-05-23 ENCOUNTER — APPOINTMENT (OUTPATIENT)
Dept: UROLOGY | Facility: CLINIC | Age: 57
End: 2022-05-23
Payer: COMMERCIAL

## 2022-05-23 PROCEDURE — 99214 OFFICE O/P EST MOD 30 MIN: CPT

## 2022-05-23 NOTE — ADDENDUM
[FreeTextEntry1] : This note was authored by Cary Martinez working as a scribe for Dr.Gary Yee. I, Dr. Ramiro Yee have reviewed the content of this note and confirm it is true and accurate. I personally performed the history and physical examination and made all the decisions 05/23/2022.

## 2022-05-23 NOTE — HISTORY OF PRESENT ILLNESS
[FreeTextEntry1] : 01/13/2021: Mr. Perez is a 54y/o M presenting today for evaluation of elevated PSA. H/o BPH. Previously saw Dr. Finnegan at [place]. Had one episode of gross hematuria that has resolved. 12/11/20 PSA was 11.3, which is decreased from 15.50 on 9/4/19. Wakes up 2-3x at night to urinate but does not need medication. Denies urinary urgency, pushing or straining. Urinates 5-6x during the day to urinate. Sexual function is good. Some constipation and takes Metamucil. No asthma, diabetes, or seizures. PSHx of neck operation 4 years ago and hip operation. Left hand weakness due to neck pain. No FHx of prostate ca or kidney stones.  with two children. Current smoker of one pack a day for 35 years, but trying to quit. \par \par 11/03/2021: Patient presents today for follow up. Urination is unchanged. Urinates 10-15x during the day. Drinks about 2 quarts of fluids a day. Drinks 1 cup of green tea a day. Wakes 2-4x at inght, although unsure if it is due to urination or sleep apnea which he think she may have. Had prostate MRI 2/13/21 for elevated PSA which was read as PIRADS 2, 173 cc prostate gland. Has some urinary hesitancy after waking up, otherwise normal throughout the day. No urinary urgency but urinates more often when he has caffeine. Had noticed one time where he urinated one drop urine, but had it after the biopsy which was negative. Had tried Tamsulosin the past, but did not feel well on it with side effects of headache, stomach ache, and difficulty sleeping. States he possibly has sleep apnea. Admits feeling tired during the day. Has had hip and knee surgery. Has tried taking half of marijuana gummy before bed after which he only woke up once. Had palpitations when he took a whole one. Erections are good. Will be going to [place] next week. \par \par 05/23/2022:  presents today for a follow up. He reports that he has an appt with a pulmonologist for his sleep apnea at the end of June. Has been taking finasteride 5 mg once daily which he noticed has shown some slight improvement. Nocturia ranges from 1-2x to 4x. Daytime urination is fairly frequent, voids about 10x a day which is annoying to the patient. The more he sleeps the weaker his stream is in the morning. Has a fairly good stream throughout the day. Denies any dysuria, burning, and gross hematuria.

## 2022-05-23 NOTE — ASSESSMENT
[FreeTextEntry1] : 01/13/2021: Mr. Perez is a 55y/o M presenting today for evaluation of elevated PSA. H/o BPH. Previously saw Dr. Finnegan at [place]. Had one episode of gross hematuria that has resolved. 12/11/20 PSA was 11.3, which is decreased from 15.50 on 9/4/19. Wakes up 2-3x at night to urinate but does not need medication. Denies urinary urgency, pushing or straining. Urinates 5-6x during the day to urinate. Sexual function is good. Some constipation and takes Metamucil. No asthma, diabetes, or seizures. PSHx of neck operation 4 years ago and hip operation. Left hand weakness due to neck pain. No FHx of prostate ca or kidney stones.  with two children. Current smoker of one pack a day for 35 years, but trying to quit. \par \par 11/03/2021: Patient presents today for follow up. Urination is unchanged. Urinates 10-15x during the day. Drinks about 2 quarts of fluids a day. Drinks 1 cup of green tea a day. Wakes 2-4x at inght, although unsure if it is due to urination or sleep apnea which he think she may have. Had prostate MRI 2/13/21 for elevated PSA which was read as PIRADS 2, 173 cc prostate gland. Has some urinary hesitancy after waking up, otherwise normal throughout the day. No urinary urgency but urinates more often when he has caffeine. Had noticed one time where he urinated one drop urine, but had it after the biopsy which was negative. Had tried Tamsulosin the past, but did not feel well on it with side effects of headache, stomach ache, and difficulty sleeping. States he possibly has sleep apnea. Admits feeling tired during the day. Has had hip and knee surgery. Has tried taking half of marijuana gummy before bed after which he only woke up once. Had palpitations when he took a whole one. Erections are good. Will be going to [place] next week. \par \par Digital rectal exam found no suspicious rectal masses. No rectal mucosal lesions. Anal tone is normal. The prostate is non tender, with normal texture, discrete borders, and no nodules that I could feel. Could only feel up to about the midgland. It is over 100 gram transurethral resection size. No gross blood on examining fingers. \par I prescribed the patient Finasteride 5 mg to take one tablet daily for BPH symptoms. I informed the patient there may be erectile dysfunction, decreased libido, breast tenderness, and hot flashes as a side effect. I explained that there is a 25% reduction of prostate cancer. \par I referred the patient to pulmonologist Dr. Pete Galvin for sleep study to see if he may need CPAP machine. \par Blood work today included BMP, alkaline phosphatase, PSA, and testosterone. \par The patient produced a urine sample which will be sent for urinalysis, urine cytology, and urine culture. \par RTO in 3 months for follow up or sooner if new urinary symptoms develop. \par \par 05/23/2022:  presents today for a follow up. He reports that he has an appt with a pulmonologist for his sleep apnea at the end of June. Has been taking finasteride 5 mg once daily which he noticed has shown some slight improvement. Nocturia ranges from 1-2x to 4x. Daytime urination is fairly frequent, voids about 10x a day which is annoying to the patient. The more he sleeps the weaker his stream is in the morning. Has a fairly good stream throughout the day. Denies any dysuria, burning, and gross hematuria. \par \par Reviewed MRI of the prostate once again from 2/13/2021. \par \par Advised the patient to decrease his fluid consumption towards the night time to decrease nocturia. \par \par I prescribed the patient Tolterodine 2 mg, one tablet every afternoon around lunch time. I informed the patient that they might experience constipation as a side effect. He will continue finasteride 5 mg once daily. \par \par Patient will RTO or schedule a telehealth visit in one month for reevaluation on tolterodine. \par \par Preparation, in person office visit, and coordination of care: 32 minutes.

## 2022-05-23 NOTE — REVIEW OF SYSTEMS
[Constipation] : constipation [Joint Pain] : joint pain [Negative] : Heme/Lymph [Nocturia] : nocturia [FreeTextEntry2] : Frequent urination

## 2022-07-21 ENCOUNTER — RX RENEWAL (OUTPATIENT)
Age: 57
End: 2022-07-21

## 2022-07-29 ENCOUNTER — APPOINTMENT (OUTPATIENT)
Dept: INTERNAL MEDICINE | Facility: CLINIC | Age: 57
End: 2022-07-29

## 2022-07-29 VITALS
BODY MASS INDEX: 31.66 KG/M2 | WEIGHT: 197 LBS | SYSTOLIC BLOOD PRESSURE: 131 MMHG | DIASTOLIC BLOOD PRESSURE: 71 MMHG | HEIGHT: 66.14 IN | HEART RATE: 76 BPM

## 2022-07-29 PROCEDURE — 36415 COLL VENOUS BLD VENIPUNCTURE: CPT

## 2022-07-29 PROCEDURE — 99214 OFFICE O/P EST MOD 30 MIN: CPT | Mod: 25

## 2022-07-29 RX ORDER — DICLOFENAC SODIUM 50 MG/1
50 TABLET, DELAYED RELEASE ORAL
Qty: 60 | Refills: 1 | Status: DISCONTINUED | OUTPATIENT
Start: 2022-02-15 | End: 2022-07-29

## 2022-07-29 NOTE — ASSESSMENT
[FreeTextEntry1] : 55 y/o male with h/o Paroxysmal afib on asa 325mg/ccb, osteoarthritis and hyperlipidemia presents for follow-up \par \par CAD, hyperlipidemia.  Improved LDL in 3/2021. CT calcium score completed, score -204, h/o HLD, currently on statin and aspirin 81 mg daily.  Cholesterol levels improved last month done by cardiologist.  Was advised to decrease dose at that time.\par -cont atorvastatin 40 mg daily to further minimize his cardiovascular risk\par -Educated of the importance of Healthy diet, such as Mediterranean Diet and Exercise, such as walking >20 minutes a day and increasing gradually as tolerated\par \par Paroxysmal afib on asa 325mg/ccb.  Having less frequently however episodes of which he monitors on his apple watch.  \par -Advised to follow-up with cardiology for possible Holter monitor, Zio patch to quantify frequency of runs of A. fib.  If less due to better diet, decreased nicotine may try to reduce dose of Cardizem\par -cont meds as directed \par \par h/o elevated PSA, was recently seen by urologist.  Rectal exam-normal.  MRI prostate-normal.  Still having nocturia, several times a night.  Unable to tolerate Flomax\par -Seen by urology\par \par Nicotine dependence, now vaping occasionally, but greatly reduced after starting nicotine replacements.\par -Congratulated and advised to refrain completely\par \par Right shoulder pain, likely tendinitis\par -orthopedic evaluation appreciated \par \par HCM\par -covid -up to date\par -discussed shingles vaccine (shingrix), advised to verify with insurance if its covered through medical or prescription plan\par \par \par f/u in 3 months \par \par Please note that 36 minutes time spent in care with this patient which includes pre-visit preparation such as reviewed pertinent labs, imaging, and specialists consultation, in-person visit, post-visit documentation and discussion.\par

## 2022-07-29 NOTE — PHYSICAL EXAM
[Pedal Pulses Present] : the pedal pulses are present [No Edema] : there was no peripheral edema [Normal Posterior Cervical Nodes] : no posterior cervical lymphadenopathy [Normal Anterior Cervical Nodes] : no anterior cervical lymphadenopathy [Normal] : normal gait, coordination grossly intact, no focal deficits and deep tendon reflexes were 2+ and symmetric [de-identified] : Mild tenderness over the lateral epicondyle

## 2022-07-29 NOTE — HISTORY OF PRESENT ILLNESS
[de-identified] : 57 y/o male with h/o Paroxysmal afib on asa 325mg/ccb, osteoarthritis and hyperlipidemia presents for follow-up.  \par \par still having one cig in the morning, but greatly reduced after starting nicotine replacements.  breathing better.  \par started vaping somewhat but plans to stop.\par Some weight gain since then but plans to work on healthy diet.  Exercise\par \par compliant with meds, no SE.  \par \par Has been watching his diet.  Endorses less sugary drinks and adding sugar to his coffee.  Noted weight loss.\par

## 2022-08-01 ENCOUNTER — CLINICAL ADVICE (OUTPATIENT)
Age: 57
End: 2022-08-01

## 2022-08-01 LAB
ALBUMIN SERPL ELPH-MCNC: 4.4 G/DL
ALP BLD-CCNC: 47 U/L
ALT SERPL-CCNC: 24 U/L
ANION GAP SERPL CALC-SCNC: 10 MMOL/L
AST SERPL-CCNC: 16 U/L
BASOPHILS # BLD AUTO: 0.04 K/UL
BASOPHILS NFR BLD AUTO: 0.6 %
BILIRUB DIRECT SERPL-MCNC: 0.1 MG/DL
BILIRUB INDIRECT SERPL-MCNC: 0.2 MG/DL
BILIRUB SERPL-MCNC: 0.4 MG/DL
BUN SERPL-MCNC: 13 MG/DL
CALCIUM SERPL-MCNC: 9 MG/DL
CHLORIDE SERPL-SCNC: 106 MMOL/L
CHOLEST SERPL-MCNC: 133 MG/DL
CO2 SERPL-SCNC: 26 MMOL/L
CREAT SERPL-MCNC: 0.94 MG/DL
EGFR: 94 ML/MIN/1.73M2
EOSINOPHIL # BLD AUTO: 0.13 K/UL
EOSINOPHIL NFR BLD AUTO: 2 %
ESTIMATED AVERAGE GLUCOSE: 117 MG/DL
GLUCOSE SERPL-MCNC: 97 MG/DL
HBA1C MFR BLD HPLC: 5.7 %
HCT VFR BLD CALC: 43.8 %
HDLC SERPL-MCNC: 56 MG/DL
HGB BLD-MCNC: 14.3 G/DL
IMM GRANULOCYTES NFR BLD AUTO: 0.2 %
LDLC SERPL CALC-MCNC: 67 MG/DL
LYMPHOCYTES # BLD AUTO: 2.48 K/UL
LYMPHOCYTES NFR BLD AUTO: 38.5 %
MAN DIFF?: NORMAL
MCHC RBC-ENTMCNC: 31.7 PG
MCHC RBC-ENTMCNC: 32.6 GM/DL
MCV RBC AUTO: 97.1 FL
MONOCYTES # BLD AUTO: 0.52 K/UL
MONOCYTES NFR BLD AUTO: 8.1 %
NEUTROPHILS # BLD AUTO: 3.26 K/UL
NEUTROPHILS NFR BLD AUTO: 50.6 %
NONHDLC SERPL-MCNC: 78 MG/DL
PLATELET # BLD AUTO: 219 K/UL
POTASSIUM SERPL-SCNC: 4.7 MMOL/L
PROT SERPL-MCNC: 6.7 G/DL
RBC # BLD: 4.51 M/UL
RBC # FLD: 12.8 %
SODIUM SERPL-SCNC: 142 MMOL/L
TRIGL SERPL-MCNC: 52 MG/DL
TSH SERPL-ACNC: 1.11 UIU/ML
WBC # FLD AUTO: 6.44 K/UL

## 2022-10-27 ENCOUNTER — APPOINTMENT (OUTPATIENT)
Dept: INTERNAL MEDICINE | Facility: CLINIC | Age: 57
End: 2022-10-27

## 2022-10-27 VITALS
SYSTOLIC BLOOD PRESSURE: 109 MMHG | DIASTOLIC BLOOD PRESSURE: 75 MMHG | BODY MASS INDEX: 31.98 KG/M2 | HEIGHT: 66.14 IN | WEIGHT: 199 LBS | HEART RATE: 98 BPM

## 2022-10-27 DIAGNOSIS — Z23 ENCOUNTER FOR IMMUNIZATION: ICD-10-CM

## 2022-10-27 PROCEDURE — 90686 IIV4 VACC NO PRSV 0.5 ML IM: CPT

## 2022-10-27 PROCEDURE — G0008: CPT

## 2022-10-27 PROCEDURE — 99214 OFFICE O/P EST MOD 30 MIN: CPT | Mod: 25

## 2022-10-27 RX ORDER — TOLTERODINE TARTARATE 2 MG/1
2 TABLET ORAL DAILY
Qty: 90 | Refills: 3 | Status: DISCONTINUED | COMMUNITY
Start: 2022-05-23 | End: 2022-10-27

## 2022-10-27 NOTE — ASSESSMENT
[FreeTextEntry1] : 56 y/o male with h/o Paroxysmal afib on asa 325mg/ccb, osteoarthritis and hyperlipidemia presents for follow-up \par \par CAD, hyperlipidemia.  Improved LDL in 3/2021. CT calcium score completed, score -204, h/o HLD, currently on statin and aspirin 81 mg daily.  Cholesterol levels improved last month done by cardiologist.  Was advised to decrease dose at that time.\par -cont atorvastatin 40 mg daily to further minimize his cardiovascular risk\par -Educated of the importance of Healthy diet, such as Mediterranean Diet and Exercise, such as walking >20 minutes a day and increasing gradually as tolerated\par \par Paroxysmal afib on asa 325mg/ccb.  Having less frequently however episodes of which he monitors on his apple watch.  \par -Advised to follow-up with cardiology for possible Holter monitor, Zio patch to quantify frequency of runs of A. fib.  If less due to better diet, decreased nicotine may try to reduce dose of Cardizem\par -cont meds as directed \par \par h/o elevated PSA, was recently seen by urologist.  Rectal exam-normal.  MRI prostate-normal.  Still having nocturia, several times a night.  Unable to tolerate Flomax\par -Seen by urology\par \par Nicotine dependence, now vaping occasionally, but greatly reduced after starting nicotine replacements.\par -Congratulated and advised to refrain completely\par \par Right shoulder pain, likely tendinitis\par -orthopedic evaluation appreciated \par \par MUNA on CPAP.  follows with Dr. Chan\par \par HCM\par -flu -given today \par -covid -up to date\par -discussed shingles vaccine (shingrix), advised to verify with insurance if its covered through medical or prescription plan\par \par \par f/u in 3 months \par \par Please note that 34 minutes time spent in care with this patient which includes pre-visit preparation such as reviewed pertinent labs, imaging, and specialists consultation, in-person visit, post-visit documentation and discussion.\par

## 2022-10-27 NOTE — PHYSICAL EXAM
[Pedal Pulses Present] : the pedal pulses are present [No Edema] : there was no peripheral edema [Normal Posterior Cervical Nodes] : no posterior cervical lymphadenopathy [Normal Anterior Cervical Nodes] : no anterior cervical lymphadenopathy [Normal] : normal gait, coordination grossly intact, no focal deficits and deep tendon reflexes were 2+ and symmetric [de-identified] : Mild tenderness over the lateral epicondyle

## 2022-10-27 NOTE — HISTORY OF PRESENT ILLNESS
[de-identified] : 56 y/o male with h/o Paroxysmal afib on asa 325mg/ccb, osteoarthritis and hyperlipidemia presents for follow-up.  \par \par has started CPAP machine with good results \par \par still having one cig in the morning, but greatly reduced after starting nicotine replacements.  breathing better.  \par started vaping somewhat but plans to stop.\par Some weight gain since then but plans to work on healthy diet.  \par \par compliant with meds, no SE.  \par \par Has been watching his diet.  Endorses less sugary drinks and adding sugar to his coffee.  Noted weight loss.\par

## 2022-10-28 ENCOUNTER — CLINICAL ADVICE (OUTPATIENT)
Age: 57
End: 2022-10-28

## 2022-10-28 LAB
ALBUMIN SERPL ELPH-MCNC: 4.4 G/DL
ALP BLD-CCNC: 51 U/L
ALT SERPL-CCNC: 30 U/L
ANION GAP SERPL CALC-SCNC: 11 MMOL/L
AST SERPL-CCNC: 22 U/L
BASOPHILS # BLD AUTO: 0.05 K/UL
BASOPHILS NFR BLD AUTO: 0.6 %
BILIRUB DIRECT SERPL-MCNC: 0.1 MG/DL
BILIRUB INDIRECT SERPL-MCNC: 0.3 MG/DL
BILIRUB SERPL-MCNC: 0.4 MG/DL
BUN SERPL-MCNC: 14 MG/DL
CALCIUM SERPL-MCNC: 9.5 MG/DL
CHLORIDE SERPL-SCNC: 106 MMOL/L
CHOLEST SERPL-MCNC: 144 MG/DL
CO2 SERPL-SCNC: 24 MMOL/L
CREAT SERPL-MCNC: 0.96 MG/DL
EGFR: 91 ML/MIN/1.73M2
EOSINOPHIL # BLD AUTO: 0.16 K/UL
EOSINOPHIL NFR BLD AUTO: 2.1 %
GLUCOSE SERPL-MCNC: 99 MG/DL
HCT VFR BLD CALC: 46.1 %
HDLC SERPL-MCNC: 57 MG/DL
HGB BLD-MCNC: 15 G/DL
IMM GRANULOCYTES NFR BLD AUTO: 0.3 %
LDLC SERPL CALC-MCNC: 74 MG/DL
LYMPHOCYTES # BLD AUTO: 2.87 K/UL
LYMPHOCYTES NFR BLD AUTO: 37.2 %
MAN DIFF?: NORMAL
MCHC RBC-ENTMCNC: 31.4 PG
MCHC RBC-ENTMCNC: 32.5 GM/DL
MCV RBC AUTO: 96.4 FL
MONOCYTES # BLD AUTO: 0.66 K/UL
MONOCYTES NFR BLD AUTO: 8.6 %
NEUTROPHILS # BLD AUTO: 3.95 K/UL
NEUTROPHILS NFR BLD AUTO: 51.2 %
NONHDLC SERPL-MCNC: 87 MG/DL
PLATELET # BLD AUTO: 239 K/UL
POTASSIUM SERPL-SCNC: 4.5 MMOL/L
PROT SERPL-MCNC: 6.9 G/DL
RBC # BLD: 4.78 M/UL
RBC # FLD: 13 %
SODIUM SERPL-SCNC: 141 MMOL/L
TRIGL SERPL-MCNC: 66 MG/DL
TSH SERPL-ACNC: 1.31 UIU/ML
WBC # FLD AUTO: 7.71 K/UL

## 2022-11-15 ENCOUNTER — APPOINTMENT (OUTPATIENT)
Dept: UROLOGY | Facility: CLINIC | Age: 57
End: 2022-11-15

## 2022-11-15 LAB
CHOLEST SERPL-MCNC: 122 MG/DL
ESTIMATED AVERAGE GLUCOSE: 114 MG/DL
HBA1C MFR BLD HPLC: 5.6 %
HDLC SERPL-MCNC: 50 MG/DL
LDLC SERPL CALC-MCNC: 58 MG/DL
NONHDLC SERPL-MCNC: 72 MG/DL
PSA SERPL-MCNC: 8.53 NG/ML
TRIGL SERPL-MCNC: 71 MG/DL

## 2022-11-15 PROCEDURE — 99443: CPT

## 2022-11-16 NOTE — REVIEW OF SYSTEMS
[Constipation] : constipation [Nocturia] : nocturia [Joint Pain] : joint pain [Negative] : Heme/Lymph [FreeTextEntry2] : Frequent urination

## 2022-11-16 NOTE — HISTORY OF PRESENT ILLNESS
[FreeTextEntry1] : 01/13/2021: Mr. Garcia is a 54y/o M presenting today for evaluation of elevated PSA. H/o BPH. Previously saw Dr. Finnegan at [place]. Had one episode of gross hematuria that has resolved. 12/11/20 PSA was 11.3, which is decreased from 15.50 on 9/4/19. Wakes up 2-3x at night to urinate but does not need medication. Denies urinary urgency, pushing or straining. Urinates 5-6x during the day to urinate. Sexual function is good. Some constipation and takes Metamucil. No asthma, diabetes, or seizures. PSHx of neck operation 4 years ago and hip operation. Left hand weakness due to neck pain. No FHx of prostate ca or kidney stones.  with two children. Current smoker of one pack a day for 35 years, but trying to quit. \par \par 11/03/2021: Patient presents today for follow up. Urination is unchanged. Urinates 10-15x during the day. Drinks about 2 quarts of fluids a day. Drinks 1 cup of green tea a day. Wakes 2-4x at inght, although unsure if it is due to urination or sleep apnea which he think she may have. Had prostate MRI 2/13/21 for elevated PSA which was read as PIRADS 2, 173 cc prostate gland. Has some urinary hesitancy after waking up, otherwise normal throughout the day. No urinary urgency but urinates more often when he has caffeine. Had noticed one time where he urinated one drop urine, but had it after the biopsy which was negative. Had tried Tamsulosin the past, but did not feel well on it with side effects of headache, stomach ache, and difficulty sleeping. States he possibly has sleep apnea. Admits feeling tired during the day. Has had hip and knee surgery. Has tried taking half of marijuana gummy before bed after which he only woke up once. Had palpitations when he took a whole one. Erections are good. Will be going to [place] next week. \par \par 05/23/2022:  presents today for a follow up. He reports that he has an appt with a pulmonologist for his sleep apnea at the end of June. Has been taking finasteride 5 mg once daily which he noticed has shown some slight improvement. Nocturia ranges from 1-2x to 4x. Daytime urination is fairly frequent, voids about 10x a day which is annoying to the patient. The more he sleeps the weaker his stream is in the morning. Has a fairly good stream throughout the day. Denies any dysuria, burning, and gross hematuria. \par \par 11/15/2022: Mr. MYRNA GARCIA presents today for a follow up audio only telephone visit for which he gave permission for. Patient discontinued use of tolterodine due to side effects. Patient is maintained on Finasteride 5 MG. He reports beginning use of CPAP 1 month ago for sleep apnea, reports decrease in nocturia, and now feels well rested in the mornings as he is not waking up every hour to urinate. Patient reports a frequency of 8 times a day with a fluid intake of 4 bottles of water - patient is not bothered by his daytime frequency. He denies dysuria and gross hematuria. PSA was 8.5 on 02/08/2022. Patient reports his erections and sexual desire are good. Patient has quit smoking in 05/2022.

## 2022-11-16 NOTE — ASSESSMENT
[FreeTextEntry1] : 01/13/2021: Mr. Garcia is a 55y/o M presenting today for evaluation of elevated PSA. H/o BPH. Previously saw Dr. Finnegan at [place]. Had one episode of gross hematuria that has resolved. 12/11/20 PSA was 11.3, which is decreased from 15.50 on 9/4/19. Wakes up 2-3x at night to urinate but does not need medication. Denies urinary urgency, pushing or straining. Urinates 5-6x during the day to urinate. Sexual function is good. Some constipation and takes Metamucil. No asthma, diabetes, or seizures. PSHx of neck operation 4 years ago and hip operation. Left hand weakness due to neck pain. No FHx of prostate ca or kidney stones.  with two children. Current smoker of one pack a day for 35 years, but trying to quit. \par \par 11/03/2021: Patient presents today for follow up. Urination is unchanged. Urinates 10-15x during the day. Drinks about 2 quarts of fluids a day. Drinks 1 cup of green tea a day. Wakes 2-4x at inght, although unsure if it is due to urination or sleep apnea which he think she may have. Had prostate MRI 2/13/21 for elevated PSA which was read as PIRADS 2, 173 cc prostate gland. Has some urinary hesitancy after waking up, otherwise normal throughout the day. No urinary urgency but urinates more often when he has caffeine. Had noticed one time where he urinated one drop urine, but had it after the biopsy which was negative. Had tried Tamsulosin the past, but did not feel well on it with side effects of headache, stomach ache, and difficulty sleeping. States he possibly has sleep apnea. Admits feeling tired during the day. Has had hip and knee surgery. Has tried taking half of marijuana gummy before bed after which he only woke up once. Had palpitations when he took a whole one. Erections are good. Will be going to [place] next week. \par \par Digital rectal exam found no suspicious rectal masses. No rectal mucosal lesions. Anal tone is normal. The prostate is non tender, with normal texture, discrete borders, and no nodules that I could feel. Could only feel up to about the midgland. It is over 100 gram transurethral resection size. No gross blood on examining fingers. \par I prescribed the patient Finasteride 5 mg to take one tablet daily for BPH symptoms. I informed the patient there may be erectile dysfunction, decreased libido, breast tenderness, and hot flashes as a side effect. I explained that there is a 25% reduction of prostate cancer. \par I referred the patient to pulmonologist Dr. Pete Galvin for sleep study to see if he may need CPAP machine. \par Blood work today included BMP, alkaline phosphatase, PSA, and testosterone. \par The patient produced a urine sample which will be sent for urinalysis, urine cytology, and urine culture. \par RTO in 3 months for follow up or sooner if new urinary symptoms develop. \par \par 05/23/2022:  presents today for a follow up. He reports that he has an appt with a pulmonologist for his sleep apnea at the end of June. Has been taking finasteride 5 mg once daily which he noticed has shown some slight improvement. Nocturia ranges from 1-2x to 4x. Daytime urination is fairly frequent, voids about 10x a day which is annoying to the patient. The more he sleeps the weaker his stream is in the morning. Has a fairly good stream throughout the day. Denies any dysuria, burning, and gross hematuria. \par \par Reviewed MRI of the prostate once again from 2/13/2021. \par \par Advised the patient to decrease his fluid consumption towards the night time to decrease nocturia. \par \par I prescribed the patient Tolterodine 2 mg, one tablet every afternoon around lunch time. I informed the patient that they might experience constipation as a side effect. He will continue finasteride 5 mg once daily. \par \par 11/15/2022: Mr. MYRNA GARCIA presents today for a follow up audio only telephone visit for which he gave permission for. Patient discontinued use of tolterodine due to side effects. Patient is maintained on Finasteride 5 MG. He reports beginning use of CPAP 1 month ago for sleep apnea, reports decrease in nocturia, and now feels well rested in the mornings as he is not waking up every hour to urinate. Patient reports a frequency of 8 times a day with a fluid intake of 4 bottles of water - patient is not bothered by his daytime frequency. He denies dysuria and gross hematuria. PSA was 8.5 on 02/08/2022. Patient reports his erections and sexual desire are good. Patient has quit smoking in 05/2022.\par \par Reviewed and discussed patient's laboratory work from 02/08/2022 in detail with the patient. \par Will send patient to [facility] lab for blood work and urine testing, ordered today. \par I refilled patient's prescription of Finasteride 5 MG.\par Patient will RTO 1-2 months in office.\par \par Duration of telephone visit: 21 minutes.\par \par This note was transcribed by Ezequiel Chappell working as a scribe for Dr. Ramiro Yee. I, Dr. Ramiro Yee have reviewed the content of this note and confirm it is true and accurate. I personally performed the history and made all the decisions on 11/15/2022.

## 2023-02-02 ENCOUNTER — APPOINTMENT (OUTPATIENT)
Dept: UROLOGY | Facility: CLINIC | Age: 58
End: 2023-02-02

## 2023-02-14 ENCOUNTER — NON-APPOINTMENT (OUTPATIENT)
Age: 58
End: 2023-02-14

## 2023-02-14 ENCOUNTER — APPOINTMENT (OUTPATIENT)
Dept: INTERNAL MEDICINE | Facility: CLINIC | Age: 58
End: 2023-02-14
Payer: COMMERCIAL

## 2023-02-14 VITALS
WEIGHT: 193.25 LBS | SYSTOLIC BLOOD PRESSURE: 112 MMHG | BODY MASS INDEX: 31.06 KG/M2 | HEIGHT: 66.14 IN | DIASTOLIC BLOOD PRESSURE: 73 MMHG | HEART RATE: 90 BPM

## 2023-02-14 PROCEDURE — 93000 ELECTROCARDIOGRAM COMPLETE: CPT | Mod: 59

## 2023-02-14 PROCEDURE — 99396 PREV VISIT EST AGE 40-64: CPT | Mod: 25

## 2023-02-14 PROCEDURE — 36415 COLL VENOUS BLD VENIPUNCTURE: CPT

## 2023-02-14 NOTE — PHYSICAL EXAM
[Pedal Pulses Present] : the pedal pulses are present [No Edema] : there was no peripheral edema [Normal Posterior Cervical Nodes] : no posterior cervical lymphadenopathy [Normal Anterior Cervical Nodes] : no anterior cervical lymphadenopathy [Normal] : normal gait, coordination grossly intact, no focal deficits and deep tendon reflexes were 2+ and symmetric [de-identified] : not performed as wearing mask due to covid precautions [de-identified] : Mild tenderness over the lateral epicondyle

## 2023-02-14 NOTE — HISTORY OF PRESENT ILLNESS
[de-identified] : 56 y/o male with h/o Paroxysmal afib on asa 325mg/ccb, osteoarthritis and hyperlipidemia presents for follow-up.  \par \par has started CPAP machine with good results \par \par still  vaping somewhat but plans to stop.\par Noted intentional weight loss due to healthy diet, exercise\par \par Has noticed bright red blood per rectum.  Usually after bowel movement.  Resolved after stopping aspirin 325 mg daily.  Follows with cardiology.  No recent episodes of Afib\par \par compliant with meds, no SE.  \par \par Has been watching his diet.  Endorses less sugary drinks and adding sugar to his coffee.  Noted weight loss.\par

## 2023-02-14 NOTE — HEALTH RISK ASSESSMENT
[Good] : ~his/her~  mood as  good [No falls in past year] : Patient reported no falls in the past year [0] : 2) Feeling down, depressed, or hopeless: Not at all (0) [PHQ-2 Negative - No further assessment needed] : PHQ-2 Negative - No further assessment needed [Patient reported colonoscopy was normal] : Patient reported colonoscopy was normal [Employed] : employed [] :  [Fully functional (bathing, dressing, toileting, transferring, walking, feeding)] : Fully functional (bathing, dressing, toileting, transferring, walking, feeding) [Fully functional (using the telephone, shopping, preparing meals, housekeeping, doing laundry, using] : Fully functional and needs no help or supervision to perform IADLs (using the telephone, shopping, preparing meals, housekeeping, doing laundry, using transportation, managing medications and managing finances) [[id number]] : 0 [Change in mental status noted] : No change in mental status noted [Reports changes in hearing] : Reports no changes in hearing [Reports changes in vision] : Reports no changes in vision [ColonoscopyDate] : 2016

## 2023-02-14 NOTE — ASSESSMENT
[FreeTextEntry1] : 56 y/o male with h/o Paroxysmal afib on asa 325mg/ccb, osteoarthritis and hyperlipidemia presents for annual exam\par \par CAD, hyperlipidemia.  Improved LDL in 3/2021. CT calcium score completed, score -204, h/o HLD, currently on statin and aspirin 81 mg daily.  Cholesterol levels improved last month done by cardiologist.  Was advised to decrease dose at that time.\par -cont atorvastatin 40 mg daily to further minimize his cardiovascular risk\par -Educated of the importance of Healthy diet, such as Mediterranean Diet and Exercise, such as walking >20 minutes a day and increasing gradually as tolerated\par \par Paroxysmal afib on asa 325mg/ccb.  Having less frequently however episodes of which he monitors on his apple watch.  \par -Advised to follow-up with cardiology for possible Holter monitor, Zio patch to quantify frequency of runs of Afib.  If less due to better diet, decreased nicotine may try to reduce dose of Cardizem.  \par -cont meds as directed \par \par h/o elevated PSA, was recently seen by urologist.  Rectal exam-normal.  MRI prostate-normal.  Still having nocturia, several times a night.  Unable to tolerate Flomax\par -Seen by urology, currently on finasteride \par \par Nicotine dependence, now vaping occasionally, but greatly reduced after starting nicotine replacements.\par -Congratulated and advised to refrain completely\par -smoking cessation referral \par \par Right shoulder pain, likely tendinitis\par -orthopedic evaluation appreciated \par \par MUNA on CPAP.  follows with Dr. Chan\par \par Healthcare Maintenance\par -Advise Yearly Skin cancer screening with Dermatologist \par -Advise Yearly Eye exam with Ophthalmologist\par -Advise Yearly Dental exam\par -Educated of the importance of Healthy diet, such as Mediterranean Diet and Exercise, such as walking >20 minutes a day and increasing gradually as tolerated\par \par Immunizations\par -Flu vaccine  -up to date \par -Covid vaccine  -up to date \par \par Preventative screening \par -advised to get colonoscopy for colon cancer screening -due for repeat \par -will check PSA for prostate cancer screening -labs drawn \par \par

## 2023-02-15 ENCOUNTER — NON-APPOINTMENT (OUTPATIENT)
Age: 58
End: 2023-02-15

## 2023-02-15 ENCOUNTER — CLINICAL ADVICE (OUTPATIENT)
Age: 58
End: 2023-02-15

## 2023-02-15 LAB
ALBUMIN SERPL ELPH-MCNC: 4.4 G/DL
ALP BLD-CCNC: 49 U/L
ALT SERPL-CCNC: 21 U/L
AST SERPL-CCNC: 18 U/L
BILIRUB DIRECT SERPL-MCNC: 0.2 MG/DL
BILIRUB INDIRECT SERPL-MCNC: 0.3 MG/DL
BILIRUB SERPL-MCNC: 0.5 MG/DL
CHOLEST SERPL-MCNC: 141 MG/DL
ESTIMATED AVERAGE GLUCOSE: 114 MG/DL
FERRITIN SERPL-MCNC: 94 NG/ML
FOLATE SERPL-MCNC: 19.1 NG/ML
HBA1C MFR BLD HPLC: 5.6 %
HDLC SERPL-MCNC: 51 MG/DL
IRON SATN MFR SERPL: 53 %
IRON SERPL-MCNC: 148 UG/DL
LDLC SERPL CALC-MCNC: 77 MG/DL
NONHDLC SERPL-MCNC: 89 MG/DL
PROT SERPL-MCNC: 6.8 G/DL
TIBC SERPL-MCNC: 278 UG/DL
TRIGL SERPL-MCNC: 62 MG/DL
UIBC SERPL-MCNC: 131 UG/DL
VIT B12 SERPL-MCNC: 506 PG/ML

## 2023-04-04 LAB
ANION GAP SERPL CALC-SCNC: 12 MMOL/L
APPEARANCE: CLEAR
BACTERIA: NEGATIVE
BASOPHILS # BLD AUTO: 0.02 K/UL
BASOPHILS NFR BLD AUTO: 0.3 %
BILIRUBIN URINE: NEGATIVE
BLOOD URINE: NEGATIVE
BUN SERPL-MCNC: 17 MG/DL
CALCIUM SERPL-MCNC: 9.2 MG/DL
CHLORIDE SERPL-SCNC: 105 MMOL/L
CO2 SERPL-SCNC: 23 MMOL/L
COLOR: NORMAL
CREAT SERPL-MCNC: 0.99 MG/DL
EGFR: 88 ML/MIN/1.73M2
EOSINOPHIL # BLD AUTO: 0.11 K/UL
EOSINOPHIL NFR BLD AUTO: 1.8 %
GLUCOSE QUALITATIVE U: NEGATIVE
GLUCOSE SERPL-MCNC: 99 MG/DL
HCT VFR BLD CALC: 44.6 %
HGB BLD-MCNC: 14.7 G/DL
HYALINE CASTS: 1 /LPF
IMM GRANULOCYTES NFR BLD AUTO: 0.2 %
KETONES URINE: NEGATIVE
LEUKOCYTE ESTERASE URINE: NEGATIVE
LYMPHOCYTES # BLD AUTO: 2.09 K/UL
LYMPHOCYTES NFR BLD AUTO: 34.4 %
MAN DIFF?: NORMAL
MCHC RBC-ENTMCNC: 31.1 PG
MCHC RBC-ENTMCNC: 33 GM/DL
MCV RBC AUTO: 94.5 FL
MICROSCOPIC-UA: NORMAL
MONOCYTES # BLD AUTO: 0.47 K/UL
MONOCYTES NFR BLD AUTO: 7.7 %
NEUTROPHILS # BLD AUTO: 3.37 K/UL
NEUTROPHILS NFR BLD AUTO: 55.6 %
NITRITE URINE: NEGATIVE
PH URINE: 6
PLATELET # BLD AUTO: 204 K/UL
POTASSIUM SERPL-SCNC: 4.7 MMOL/L
PROTEIN URINE: NORMAL
PSA SERPL-MCNC: 5.2 NG/ML
RBC # BLD: 4.72 M/UL
RBC # FLD: 12.6 %
RED BLOOD CELLS URINE: 2 /HPF
SODIUM SERPL-SCNC: 140 MMOL/L
SPECIFIC GRAVITY URINE: 1.03
SQUAMOUS EPITHELIAL CELLS: 0 /HPF
TSH SERPL-ACNC: 1.04 UIU/ML
UROBILINOGEN URINE: NORMAL
WBC # FLD AUTO: 6.07 K/UL
WHITE BLOOD CELLS URINE: 1 /HPF

## 2023-04-05 ENCOUNTER — NON-APPOINTMENT (OUTPATIENT)
Age: 58
End: 2023-04-05

## 2023-04-06 LAB
PSA FREE FLD-MCNC: 16 %
PSA FREE SERPL-MCNC: 0.74 NG/ML
PSA SERPL-MCNC: 4.56 NG/ML

## 2023-04-07 LAB
APPEARANCE: CLEAR
BILIRUBIN URINE: NEGATIVE
BLOOD URINE: NEGATIVE
COLOR: YELLOW
GLUCOSE QUALITATIVE U: NEGATIVE MG/DL
KETONES URINE: NEGATIVE MG/DL
LEUKOCYTE ESTERASE URINE: NEGATIVE
MICROSCOPIC-UA: NORMAL
NITRITE URINE: NEGATIVE
PH URINE: 6
PROTEIN URINE: NEGATIVE MG/DL
RED BLOOD CELLS URINE: NORMAL /HPF
SPECIFIC GRAVITY URINE: 1.02
UROBILINOGEN URINE: 0.2 MG/DL
WHITE BLOOD CELLS URINE: NORMAL /HPF

## 2023-04-09 LAB
BACTERIA UR CULT: NORMAL
URINE CYTOLOGY: NORMAL

## 2023-04-11 LAB
TESTOST FREE SERPL-MCNC: 13 PG/ML
TESTOST SERPL-MCNC: 448 NG/DL

## 2023-04-13 ENCOUNTER — APPOINTMENT (OUTPATIENT)
Dept: UROLOGY | Facility: CLINIC | Age: 58
End: 2023-04-13

## 2023-04-19 ENCOUNTER — APPOINTMENT (OUTPATIENT)
Dept: UROLOGY | Facility: CLINIC | Age: 58
End: 2023-04-19
Payer: COMMERCIAL

## 2023-04-19 VITALS
DIASTOLIC BLOOD PRESSURE: 74 MMHG | BODY MASS INDEX: 30.29 KG/M2 | TEMPERATURE: 98.3 F | WEIGHT: 193 LBS | HEIGHT: 67 IN | SYSTOLIC BLOOD PRESSURE: 124 MMHG | OXYGEN SATURATION: 97 % | RESPIRATION RATE: 16 BRPM | HEART RATE: 70 BPM

## 2023-04-19 PROCEDURE — 99214 OFFICE O/P EST MOD 30 MIN: CPT

## 2023-04-20 NOTE — PHYSICAL EXAM
[General Appearance - Well Developed] : well developed [General Appearance - Well Nourished] : well nourished [Normal Appearance] : normal appearance [Well Groomed] : well groomed [General Appearance - In No Acute Distress] : no acute distress [Abdomen Soft] : soft [Abdomen Tenderness] : non-tender [Costovertebral Angle Tenderness] : no ~M costovertebral angle tenderness [] : no respiratory distress [Edema] : no peripheral edema [Exaggerated Use Of Accessory Muscles For Inspiration] : no accessory muscle use [Respiration, Rhythm And Depth] : normal respiratory rhythm and effort [Oriented To Time, Place, And Person] : oriented to person, place, and time [Affect] : the affect was normal [Mood] : the mood was normal [Not Anxious] : not anxious [Normal Station and Gait] : the gait and station were normal for the patient's age [No Focal Deficits] : no focal deficits [FreeTextEntry1] : The knee-chest position was utilized for the PATI. Digital rectal exam found no suspicious rectal masses. Normal seminal vesicles. Anal tone is normal. The prostate is non tender, with normal texture, discrete borders, and no nodules. It is a 30 gram transurethral resection size. No rectal mucosal lesions. No gross blood on the examining finger.

## 2023-04-20 NOTE — HISTORY OF PRESENT ILLNESS
[FreeTextEntry1] : 01/13/2021: Mr. Garcia is a 56y/o M presenting today for evaluation of elevated PSA. H/o BPH. Previously saw Dr. Finnegan at [place]. Had one episode of gross hematuria that has resolved. 12/11/20 PSA was 11.3, which is decreased from 15.50 on 9/4/19. Wakes up 2-3x at night to urinate but does not need medication. Denies urinary urgency, pushing or straining. Urinates 5-6x during the day to urinate. Sexual function is good. Some constipation and takes Metamucil. No asthma, diabetes, or seizures. PSHx of neck operation 4 years ago and hip operation. Left hand weakness due to neck pain. No FHx of prostate ca or kidney stones.  with two children. Current smoker of one pack a day for 35 years, but trying to quit. \par \par 11/03/2021: Patient presents today for follow up. Urination is unchanged. Urinates 10-15x during the day. Drinks about 2 quarts of fluids a day. Drinks 1 cup of green tea a day. Wakes 2-4x at inght, although unsure if it is due to urination or sleep apnea which he think she may have. Had prostate MRI 2/13/21 for elevated PSA which was read as PIRADS 2, 173 cc prostate gland. Has some urinary hesitancy after waking up, otherwise normal throughout the day. No urinary urgency but urinates more often when he has caffeine. Had noticed one time where he urinated one drop urine, but had it after the biopsy which was negative. Had tried Tamsulosin the past, but did not feel well on it with side effects of headache, stomach ache, and difficulty sleeping. States he possibly has sleep apnea. Admits feeling tired during the day. Has had hip and knee surgery. Has tried taking half of marijuana gummy before bed after which he only woke up once. Had palpitations when he took a whole one. Erections are good. Will be going to [place] next week. \par \par 05/23/2022:  presents today for a follow up. He reports that he has an appt with a pulmonologist for his sleep apnea at the end of June. Has been taking finasteride 5 mg once daily which he noticed has shown some slight improvement. Nocturia ranges from 1-2x to 4x. Daytime urination is fairly frequent, voids about 10x a day which is annoying to the patient. The more he sleeps the weaker his stream is in the morning. Has a fairly good stream throughout the day. Denies any dysuria, burning, and gross hematuria. \par \par 11/15/2022: Mr. MYRNA GARCIA presents today for a follow up audio only telephone visit for which he gave permission for. Patient discontinued use of tolterodine due to side effects. Patient is maintained on Finasteride 5 MG. He reports beginning use of CPAP 1 month ago for sleep apnea, reports decrease in nocturia, and now feels well rested in the mornings as he is not waking up every hour to urinate. Patient reports a frequency of 8 times a day with a fluid intake of 4 bottles of water - patient is not bothered by his daytime frequency. He denies dysuria and gross hematuria. PSA was 8.5 on 02/08/2022. Patient reports his erections and sexual desire are good. Patient has quit smoking in 05/2022.\par \par 04/19/2023: Mr. MYRNA GARCIA presents today for a follow up. Patient was last seen by me in November 2022. Has been on finasteride 5 mg once daily since November 2021. 11/3/2021 PSA was 11.1. PSA done 4/6/2023 in advance of today's visit showed his PSA has declined from 15.5 and now is 4.56. 15.5 was in September 2019. The decline from 15.5 to 11 was due to antibiotics. Pt tried tamsulosin to improve urination. Tamsulosin did not work for the patient. He then tried tolterodine which did not help. His urination improved significantly on finasteride. He denies excessive urination at night, urinary urgency, frequency, pushing, straining, gross hematuria, and burning on urination. Sexual function remains good.

## 2023-04-20 NOTE — ADDENDUM
[FreeTextEntry1] : This note was authored by Cary Martinez working as a scribe for Dr.Gary Yee. I, Dr. Ramiro Yee have reviewed the content of this note and confirm it is true and accurate. I personally performed the history and physical examination and made all the decisions 04/19/2023

## 2023-04-20 NOTE — ASSESSMENT
[FreeTextEntry1] : 01/13/2021: Mr. Garcia is a 56y/o M presenting today for evaluation of elevated PSA. H/o BPH. Previously saw Dr. Finnegan at [place]. Had one episode of gross hematuria that has resolved. 12/11/20 PSA was 11.3, which is decreased from 15.50 on 9/4/19. Wakes up 2-3x at night to urinate but does not need medication. Denies urinary urgency, pushing or straining. Urinates 5-6x during the day to urinate. Sexual function is good. Some constipation and takes Metamucil. No asthma, diabetes, or seizures. PSHx of neck operation 4 years ago and hip operation. Left hand weakness due to neck pain. No FHx of prostate ca or kidney stones.  with two children. Current smoker of one pack a day for 35 years, but trying to quit. \par \par 11/03/2021: Patient presents today for follow up. Urination is unchanged. Urinates 10-15x during the day. Drinks about 2 quarts of fluids a day. Drinks 1 cup of green tea a day. Wakes 2-4x at inght, although unsure if it is due to urination or sleep apnea which he think she may have. Had prostate MRI 2/13/21 for elevated PSA which was read as PIRADS 2, 173 cc prostate gland. Has some urinary hesitancy after waking up, otherwise normal throughout the day. No urinary urgency but urinates more often when he has caffeine. Had noticed one time where he urinated one drop urine, but had it after the biopsy which was negative. Had tried Tamsulosin the past, but did not feel well on it with side effects of headache, stomach ache, and difficulty sleeping. States he possibly has sleep apnea. Admits feeling tired during the day. Has had hip and knee surgery. Has tried taking half of marijuana gummy before bed after which he only woke up once. Had palpitations when he took a whole one. Erections are good. Will be going to [place] next week. \par \par Digital rectal exam found no suspicious rectal masses. No rectal mucosal lesions. Anal tone is normal. The prostate is non tender, with normal texture, discrete borders, and no nodules that I could feel. Could only feel up to about the midgland. It is over 100 gram transurethral resection size. No gross blood on examining fingers. \par I prescribed the patient Finasteride 5 mg to take one tablet daily for BPH symptoms. I informed the patient there may be erectile dysfunction, decreased libido, breast tenderness, and hot flashes as a side effect. I explained that there is a 25% reduction of prostate cancer. \par I referred the patient to pulmonologist Dr. Pete Galvin for sleep study to see if he may need CPAP machine. \par Blood work today included BMP, alkaline phosphatase, PSA, and testosterone. \par The patient produced a urine sample which will be sent for urinalysis, urine cytology, and urine culture. \par RTO in 3 months for follow up or sooner if new urinary symptoms develop. \par \par 05/23/2022:  presents today for a follow up. He reports that he has an appt with a pulmonologist for his sleep apnea at the end of June. Has been taking finasteride 5 mg once daily which he noticed has shown some slight improvement. Nocturia ranges from 1-2x to 4x. Daytime urination is fairly frequent, voids about 10x a day which is annoying to the patient. The more he sleeps the weaker his stream is in the morning. Has a fairly good stream throughout the day. Denies any dysuria, burning, and gross hematuria. \par \par Reviewed MRI of the prostate once again from 2/13/2021. \par Advised the patient to decrease his fluid consumption towards the night time to decrease nocturia. \par I prescribed the patient Tolterodine 2 mg, one tablet every afternoon around lunch time. I informed the patient that they might experience constipation as a side effect. He will continue finasteride 5 mg once daily. \par \par 11/15/2022: Mr. MYRNA GARCIA presents today for a follow up audio only telephone visit for which he gave permission for. Patient discontinued use of tolterodine due to side effects. Patient is maintained on Finasteride 5 MG. He reports beginning use of CPAP 1 month ago for sleep apnea, reports decrease in nocturia, and now feels well rested in the mornings as he is not waking up every hour to urinate. Patient reports a frequency of 8 times a day with a fluid intake of 4 bottles of water - patient is not bothered by his daytime frequency. He denies dysuria and gross hematuria. PSA was 8.5 on 02/08/2022. Patient reports his erections and sexual desire are good. Patient has quit smoking in 05/2022.\par \par Reviewed and discussed patient's laboratory work from 02/08/2022 in detail with the patient. \par Will send patient to nearest [facility] lab for blood work and urine testing, ordered today. \par I refilled patient's prescription of Finasteride 5 MG.\par Patient will RTO 1-2 months in office.\par \par 04/19/2023: Mr. MYRNA GARCIA presents today for a follow up. Patient was last seen by me in November 2022. Has been on finasteride 5 mg once daily since November 2021. 11/3/2021 PSA was 11.1. PSA done 4/6/2023 in advance of today's visit showed his PSA has declined from 15.5 and now is 4.56. 15.5 was in September 2019. The decline from 15.5 to 11 was due to antibiotics. Pt tried tamsulosin to improve urination. Tamsulosin did not work for the patient. He then tried tolterodine which did not help. His urination improved significantly on finasteride. He denies excessive urination at night, urinary urgency, frequency, pushing, straining, gross hematuria, and burning on urination. Sexual function remains good.  \par \par The knee-chest position was utilized for the PATI. Digital rectal exam found no suspicious rectal masses. Normal seminal vesicles. Anal tone is normal. The prostate is non tender, with normal texture, discrete borders, and no nodules. It is a 30 gram transurethral resection size. No rectal mucosal lesions. No gross blood on the examining finger. \par \par Reviewed lab work of 4/6/2023 in detail with the patient. \par  \par The patient produced a urine sample which will be sent for urinalysis, urine cytology, and urine culture. \par \par I am pleased that the patient is doing well. PSA without finasteride would probably be about 9.12. This is low for him. He did have an MRI of the prostate on 2/13/2021 which was a PIRADS-2. Given all this together, I suspect the patient does not have prostate cancer and does not need an MRI again at this time but will continue to observe carefully and will change plans if things arise. \par \par Patient will return in 6 months, sooner if clinically indicated. \par \par Preparation, in person office visit, and coordination of care: 30 minutes.

## 2023-04-22 LAB
APPEARANCE: ABNORMAL
BACTERIA UR CULT: NORMAL
BACTERIA: NEGATIVE /HPF
BILIRUBIN URINE: NEGATIVE
BLOOD URINE: NEGATIVE
CAST: 0 /LPF
COLOR: YELLOW
EPITHELIAL CELLS: 0 /HPF
GLUCOSE QUALITATIVE U: NEGATIVE MG/DL
KETONES URINE: NEGATIVE MG/DL
LEUKOCYTE ESTERASE URINE: NEGATIVE
MICROSCOPIC-UA: NORMAL
NITRITE URINE: NEGATIVE
PH URINE: 6
PROTEIN URINE: NEGATIVE MG/DL
RED BLOOD CELLS URINE: 1 /HPF
SPECIFIC GRAVITY URINE: 1.03
URINE CYTOLOGY: NORMAL
UROBILINOGEN URINE: 0.2 MG/DL
WHITE BLOOD CELLS URINE: 1 /HPF

## 2023-04-25 ENCOUNTER — RX RENEWAL (OUTPATIENT)
Age: 58
End: 2023-04-25

## 2023-05-22 ENCOUNTER — APPOINTMENT (OUTPATIENT)
Dept: UROLOGY | Facility: CLINIC | Age: 58
End: 2023-05-22

## 2023-06-14 ENCOUNTER — APPOINTMENT (OUTPATIENT)
Dept: INTERNAL MEDICINE | Facility: CLINIC | Age: 58
End: 2023-06-14
Payer: COMMERCIAL

## 2023-06-14 ENCOUNTER — LABORATORY RESULT (OUTPATIENT)
Age: 58
End: 2023-06-14

## 2023-06-14 VITALS
WEIGHT: 196 LBS | SYSTOLIC BLOOD PRESSURE: 118 MMHG | HEIGHT: 67 IN | DIASTOLIC BLOOD PRESSURE: 76 MMHG | HEART RATE: 77 BPM | BODY MASS INDEX: 30.76 KG/M2

## 2023-06-14 DIAGNOSIS — R22.41 LOCALIZED SWELLING, MASS AND LUMP, RIGHT LOWER LIMB: ICD-10-CM

## 2023-06-14 DIAGNOSIS — F17.200 NICOTINE DEPENDENCE, UNSPECIFIED, UNCOMPLICATED: ICD-10-CM

## 2023-06-14 PROCEDURE — 36415 COLL VENOUS BLD VENIPUNCTURE: CPT

## 2023-06-14 PROCEDURE — 99214 OFFICE O/P EST MOD 30 MIN: CPT | Mod: 25

## 2023-06-14 NOTE — PHYSICAL EXAM
[Pedal Pulses Present] : the pedal pulses are present [No Edema] : there was no peripheral edema [Normal Posterior Cervical Nodes] : no posterior cervical lymphadenopathy [Normal Anterior Cervical Nodes] : no anterior cervical lymphadenopathy [Normal] : normal gait, coordination grossly intact, no focal deficits and deep tendon reflexes were 2+ and symmetric [de-identified] : swelling behind right knee.  soft, non-tender, mobile mass, cyst-like [de-identified] : not performed as wearing mask due to covid precautions

## 2023-06-14 NOTE — HISTORY OF PRESENT ILLNESS
[de-identified] : 56 y/o male with h/o Paroxysmal afib on asa 325mg/ccb, osteoarthritis and hyperlipidemia presents for follow-up.  \par \par overall is doing well.  \par \par has started CPAP machine with good results.  \par \par still vaping somewhat but plans to stop.\par Noted intentional weight loss due to healthy diet, exercise.  \par \par Has noticed bright red blood per rectum.  Usually after bowel movement.  Resolved after stopping aspirin 325 mg daily.  Follows with cardiology.  No recent episodes of Afib\par \par compliant with meds, no SE.  \par \par Has been watching his diet.  Endorses less sugary drinks and adding sugar to his coffee.  Noted weight loss.\par

## 2023-06-14 NOTE — ASSESSMENT
[FreeTextEntry1] : 58 y/o male with h/o Paroxysmal afib on asa 325mg/ccb, osteoarthritis and hyperlipidemia presents for f/u\par \par swelling behind right knee.  soft, non-tender, mobile mass, cyst-like\par -US lower extremity \par \par CAD, hyperlipidemia.  Improved LDL in 3/2021. CT calcium score completed, score -204, h/o HLD, currently on statin and aspirin 81 mg daily.  Cholesterol levels improved last month done by cardiologist.  Was advised to decrease dose at that time.\par -cont atorvastatin 40 mg daily to further minimize his cardiovascular risk\par -Educated of the importance of Healthy diet, such as Mediterranean Diet and Exercise, such as walking >20 minutes a day and increasing gradually as tolerated\par \par Paroxysmal afib on asa 325mg/ccb.  Having less frequently however episodes of which he monitors on his apple watch.  \par -cont meds as directed \par \par h/o elevated PSA, was recently seen by urologist.  Rectal exam-normal.  MRI prostate-normal.  Still having nocturia, several times a night.  Unable to tolerate Flomax\par -Seen by urology, currently on finasteride \par \par Nicotine dependence, now vaping occasionally, but greatly reduced after starting nicotine replacements.\par -Congratulated and advised to refrain completely\par -smoking cessation referral \par \par Right shoulder pain, likely tendinitis\par -orthopedic evaluation appreciated \par \par MUNA on CPAP.  follows with Dr. Chan\par \par \par \par

## 2023-06-15 ENCOUNTER — CLINICAL ADVICE (OUTPATIENT)
Age: 58
End: 2023-06-15

## 2023-06-15 LAB
ALBUMIN SERPL ELPH-MCNC: 4.6 G/DL
ALP BLD-CCNC: 55 U/L
ALT SERPL-CCNC: 18 U/L
ANION GAP SERPL CALC-SCNC: 12 MMOL/L
AST SERPL-CCNC: 17 U/L
BILIRUB DIRECT SERPL-MCNC: 0.2 MG/DL
BILIRUB INDIRECT SERPL-MCNC: 0.4 MG/DL
BILIRUB SERPL-MCNC: 0.5 MG/DL
BUN SERPL-MCNC: 14 MG/DL
CALCIUM SERPL-MCNC: 9.5 MG/DL
CHLORIDE SERPL-SCNC: 106 MMOL/L
CHOLEST SERPL-MCNC: 136 MG/DL
CO2 SERPL-SCNC: 24 MMOL/L
CREAT SERPL-MCNC: 0.99 MG/DL
EGFR: 88 ML/MIN/1.73M2
ESTIMATED AVERAGE GLUCOSE: 117 MG/DL
GLUCOSE SERPL-MCNC: 91 MG/DL
HBA1C MFR BLD HPLC: 5.7 %
HDLC SERPL-MCNC: 53 MG/DL
LDLC SERPL CALC-MCNC: 71 MG/DL
NONHDLC SERPL-MCNC: 83 MG/DL
POTASSIUM SERPL-SCNC: 5.6 MMOL/L
PROT SERPL-MCNC: 7.1 G/DL
SODIUM SERPL-SCNC: 143 MMOL/L
TRIGL SERPL-MCNC: 58 MG/DL
TSH SERPL-ACNC: 0.97 UIU/ML

## 2023-06-17 ENCOUNTER — RESULT REVIEW (OUTPATIENT)
Age: 58
End: 2023-06-17

## 2023-06-17 ENCOUNTER — APPOINTMENT (OUTPATIENT)
Dept: MRI IMAGING | Facility: IMAGING CENTER | Age: 58
End: 2023-06-17
Payer: COMMERCIAL

## 2023-06-17 ENCOUNTER — OUTPATIENT (OUTPATIENT)
Dept: OUTPATIENT SERVICES | Facility: HOSPITAL | Age: 58
LOS: 1 days | End: 2023-06-17
Payer: COMMERCIAL

## 2023-06-17 DIAGNOSIS — Z98.890 OTHER SPECIFIED POSTPROCEDURAL STATES: Chronic | ICD-10-CM

## 2023-06-17 DIAGNOSIS — R97.20 ELEVATED PROSTATE SPECIFIC ANTIGEN [PSA]: ICD-10-CM

## 2023-06-17 DIAGNOSIS — Z98.89 OTHER SPECIFIED POSTPROCEDURAL STATES: Chronic | ICD-10-CM

## 2023-06-17 DIAGNOSIS — Z96.60 PRESENCE OF UNSPECIFIED ORTHOPEDIC JOINT IMPLANT: Chronic | ICD-10-CM

## 2023-06-17 PROCEDURE — 76498 UNLISTED MR PROCEDURE: CPT

## 2023-06-17 PROCEDURE — A9585: CPT

## 2023-06-17 PROCEDURE — 76498P: CUSTOM | Mod: 26

## 2023-06-17 PROCEDURE — 72197 MRI PELVIS W/O & W/DYE: CPT

## 2023-06-17 PROCEDURE — 72197 MRI PELVIS W/O & W/DYE: CPT | Mod: 26

## 2023-06-19 ENCOUNTER — NON-APPOINTMENT (OUTPATIENT)
Age: 58
End: 2023-06-19

## 2023-06-27 ENCOUNTER — APPOINTMENT (OUTPATIENT)
Dept: UROLOGY | Facility: CLINIC | Age: 58
End: 2023-06-27
Payer: COMMERCIAL

## 2023-06-27 DIAGNOSIS — E87.5 HYPERKALEMIA: ICD-10-CM

## 2023-06-27 PROCEDURE — 99215 OFFICE O/P EST HI 40 MIN: CPT | Mod: 95

## 2023-06-27 NOTE — ASSESSMENT
[FreeTextEntry1] : 01/13/2021: Mr. Garcia is a 56y/o M presenting today for evaluation of elevated PSA. H/o BPH. Previously saw Dr. Finnegan at [place]. Had one episode of gross hematuria that has resolved. 12/11/20 PSA was 11.3, which is decreased from 15.50 on 9/4/19. Wakes up 2-3x at night to urinate but does not need medication. Denies urinary urgency, pushing or straining. Urinates 5-6x during the day to urinate. Sexual function is good. Some constipation and takes Metamucil. No asthma, diabetes, or seizures. PSHx of neck operation 4 years ago and hip operation. Left hand weakness due to neck pain. No FHx of prostate ca or kidney stones.  with two children. Current smoker of one pack a day for 35 years, but trying to quit. \par \par 11/03/2021: Patient presents today for follow up. Urination is unchanged. Urinates 10-15x during the day. Drinks about 2 quarts of fluids a day. Drinks 1 cup of green tea a day. Wakes 2-4x at inght, although unsure if it is due to urination or sleep apnea which he think she may have. Had prostate MRI 2/13/21 for elevated PSA which was read as PIRADS 2, 173 cc prostate gland. Has some urinary hesitancy after waking up, otherwise normal throughout the day. No urinary urgency but urinates more often when he has caffeine. Had noticed one time where he urinated one drop urine, but had it after the biopsy which was negative. Had tried Tamsulosin the past, but did not feel well on it with side effects of headache, stomach ache, and difficulty sleeping. States he possibly has sleep apnea. Admits feeling tired during the day. Has had hip and knee surgery. Has tried taking half of marijuana gummy before bed after which he only woke up once. Had palpitations when he took a whole one. Erections are good. Will be going to [place] next week. \par \par Digital rectal exam found no suspicious rectal masses. No rectal mucosal lesions. Anal tone is normal. The prostate is non tender, with normal texture, discrete borders, and no nodules that I could feel. Could only feel up to about the midgland. It is over 100 gram transurethral resection size. No gross blood on examining fingers. \par I prescribed the patient Finasteride 5 mg to take one tablet daily for BPH symptoms. I informed the patient there may be erectile dysfunction, decreased libido, breast tenderness, and hot flashes as a side effect. I explained that there is a 25% reduction of prostate cancer. \par I referred the patient to pulmonologist Dr. Pete Galvin for sleep study to see if he may need CPAP machine. \par Blood work today included BMP, alkaline phosphatase, PSA, and testosterone. \par The patient produced a urine sample which will be sent for urinalysis, urine cytology, and urine culture. \par RTO in 3 months for follow up or sooner if new urinary symptoms develop. \par \par 05/23/2022:  presents today for a follow up. He reports that he has an appt with a pulmonologist for his sleep apnea at the end of June. Has been taking finasteride 5 mg once daily which he noticed has shown some slight improvement. Nocturia ranges from 1-2x to 4x. Daytime urination is fairly frequent, voids about 10x a day which is annoying to the patient. The more he sleeps the weaker his stream is in the morning. Has a fairly good stream throughout the day. Denies any dysuria, burning, and gross hematuria. \par \par Reviewed MRI of the prostate once again from 2/13/2021. \par Advised the patient to decrease his fluid consumption towards the night time to decrease nocturia. \par I prescribed the patient Tolterodine 2 mg, one tablet every afternoon around lunch time. I informed the patient that they might experience constipation as a side effect. He will continue finasteride 5 mg once daily. \par \par 11/15/2022: Mr. MYRNA GARCIA presents today for a follow up audio only telephone visit for which he gave permission for. Patient discontinued use of tolterodine due to side effects. Patient is maintained on Finasteride 5 MG. He reports beginning use of CPAP 1 month ago for sleep apnea, reports decrease in nocturia, and now feels well rested in the mornings as he is not waking up every hour to urinate. Patient reports a frequency of 8 times a day with a fluid intake of 4 bottles of water - patient is not bothered by his daytime frequency. He denies dysuria and gross hematuria. PSA was 8.5 on 02/08/2022. Patient reports his erections and sexual desire are good. Patient has quit smoking in 05/2022.\par \par Reviewed and discussed patient's laboratory work from 02/08/2022 in detail with the patient. \par Will send patient to nearest [facility] lab for blood work and urine testing, ordered today. \par I refilled patient's prescription of Finasteride 5 MG.\par Patient will RTO 1-2 months in office.\par \par 04/19/2023: Mr. MYRNA GARCIA presents today for a follow up. Patient was last seen by me in November 2022. Has been on finasteride 5 mg once daily since November 2021. 11/3/2021 PSA was 11.1. PSA done 4/6/2023 in advance of today's visit showed his PSA has declined from 15.5 and now is 4.56. 15.5 was in September 2019. The decline from 15.5 to 11 was due to antibiotics. Pt tried tamsulosin to improve urination. Tamsulosin did not work for the patient. He then tried tolterodine which did not help. His urination improved significantly on finasteride. He denies excessive urination at night, urinary urgency, frequency, pushing, straining, gross hematuria, and burning on urination. Sexual function remains good.  \par \par The knee-chest position was utilized for the PATI. Digital rectal exam found no suspicious rectal masses. Normal seminal vesicles. Anal tone is normal. The prostate is non tender, with normal texture, discrete borders, and no nodules. It is a 30 gram transurethral resection size. No rectal mucosal lesions. No gross blood on the examining finger. \par \par Reviewed lab work of 4/6/2023 in detail with the patient. \par The patient produced a urine sample which will be sent for urinalysis, urine cytology, and urine culture. \par I am pleased that the patient is doing well. PSA without finasteride would probably be about 9.12. This is low for him. He did have an MRI of the prostate on 2/13/2021 which was a PIRADS-2. Given all this together, I suspect the patient does not have prostate cancer and does not need an MRI again at this time but will continue to observe carefully and will change plans if things arise. \par Patient will return in 6 months, sooner if clinically indicated. \par \par 06/27/2023: Mr. GARCIA presents today for a follow up audio visual telehealth visit for which they gave permission for. The patient was located at home [address] [place] [address] and I was located in my office in [place], [place]. His 4/19/23 A showed pt should drikn more water as appearance is Turbid, specific gravity is elevated at 1.031. Culture showed no growth. Cytology was negative for high grade urothelial carcinoma. As of 6/17/23 Prostate MRI results are good. Prostate evaluation shows PRADS 1 which means a very low chance of clinically significant prostate cancer. Prostate Volume: 153 mL PSA density: 0.03 ng/mL/mL. No prostate biopsy is needed at this time. Patient states feeling well and offers no new complaints. Urination is good as he has attempted to increase water consumption. Occasional Nocturia 1 and urgency when he drinks a surplus of water and minimal leakage before reaching the bathroom. Patient denies dysuria, gross hematuria, burning, frequency, pushing, straining, or incontinence. The patient continues to take Finasteride. Patient has discontinued Aspirin due to bleeding complications. Denies Fhx of heart disease. Had a high calcium score. Denies ever having chest pains. On Diltiazem 240 mg once daily to control rate of Heart beat due to having Afib. \par \par Reviewed and discussed laboratory work of 4/19/23 which He  obtained prior to today's visit as requested.\par \par I have referred the patient to Dr. Patrice Monsivais.\par \par Patient will continue Aspirin as he has not have approval to d/o it from his cardiologist. \par \par Patient should decrease orange juice intake as Potassium is elevated. \par \par Pt will RTO in 3 months. Prior, Pt will go to his nearest [facility] lab for blood work including BMP, alkaline phosphatase, PSA, and testosterone and a urine sample which will be sent for urinalysis, urine culture, and urine cytology. \par \par Preparation, audio-visual visit, and coordination of care took : 45 Minutes

## 2023-06-27 NOTE — HISTORY OF PRESENT ILLNESS
[FreeTextEntry1] : 01/13/2021: Mr. Garcia is a 56y/o M presenting today for evaluation of elevated PSA. H/o BPH. Previously saw Dr. Finnegan at [place]. Had one episode of gross hematuria that has resolved. 12/11/20 PSA was 11.3, which is decreased from 15.50 on 9/4/19. Wakes up 2-3x at night to urinate but does not need medication. Denies urinary urgency, pushing or straining. Urinates 5-6x during the day to urinate. Sexual function is good. Some constipation and takes Metamucil. No asthma, diabetes, or seizures. PSHx of neck operation 4 years ago and hip operation. Left hand weakness due to neck pain. No FHx of prostate ca or kidney stones.  with two children. Current smoker of one pack a day for 35 years, but trying to quit. \par \par 11/03/2021: Patient presents today for follow up. Urination is unchanged. Urinates 10-15x during the day. Drinks about 2 quarts of fluids a day. Drinks 1 cup of green tea a day. Wakes 2-4x at inght, although unsure if it is due to urination or sleep apnea which he think she may have. Had prostate MRI 2/13/21 for elevated PSA which was read as PIRADS 2, 173 cc prostate gland. Has some urinary hesitancy after waking up, otherwise normal throughout the day. No urinary urgency but urinates more often when he has caffeine. Had noticed one time where he urinated one drop urine, but had it after the biopsy which was negative. Had tried Tamsulosin the past, but did not feel well on it with side effects of headache, stomach ache, and difficulty sleeping. States he possibly has sleep apnea. Admits feeling tired during the day. Has had hip and knee surgery. Has tried taking half of marijuana gummy before bed after which he only woke up once. Had palpitations when he took a whole one. Erections are good. Will be going to [place] next week. \par \par 05/23/2022:  presents today for a follow up. He reports that he has an appt with a pulmonologist for his sleep apnea at the end of June. Has been taking finasteride 5 mg once daily which he noticed has shown some slight improvement. Nocturia ranges from 1-2x to 4x. Daytime urination is fairly frequent, voids about 10x a day which is annoying to the patient. The more he sleeps the weaker his stream is in the morning. Has a fairly good stream throughout the day. Denies any dysuria, burning, and gross hematuria. \par \par 11/15/2022: Mr. MYRNA GARCIA presents today for a follow up audio only telephone visit for which he gave permission for. Patient discontinued use of tolterodine due to side effects. Patient is maintained on Finasteride 5 MG. He reports beginning use of CPAP 1 month ago for sleep apnea, reports decrease in nocturia, and now feels well rested in the mornings as he is not waking up every hour to urinate. Patient reports a frequency of 8 times a day with a fluid intake of 4 bottles of water - patient is not bothered by his daytime frequency. He denies dysuria and gross hematuria. PSA was 8.5 on 02/08/2022. Patient reports his erections and sexual desire are good. Patient has quit smoking in 05/2022.\par \par 04/19/2023: Mr. MYRNA GARCIA presents today for a follow up. Patient was last seen by me in November 2022. Has been on finasteride 5 mg once daily since November 2021. 11/3/2021 PSA was 11.1. PSA done 4/6/2023 in advance of today's visit showed his PSA has declined from 15.5 and now is 4.56. 15.5 was in September 2019. The decline from 15.5 to 11 was due to antibiotics. Pt tried tamsulosin to improve urination. Tamsulosin did not work for the patient. He then tried tolterodine which did not help. His urination improved significantly on finasteride. He denies excessive urination at night, urinary urgency, frequency, pushing, straining, gross hematuria, and burning on urination. Sexual function remains good.  \par \par 06/27/2023: Mr. GARCIA presents today for a follow up audio visual telehealth visit for which they gave permission for. The patient was located at home [address] [place] [address] and I was located in my office in [place], [place]. His 4/19/23 A showed pt should drikn more water as appearance is Turbid, specific gravity is elevated at 1.031. Culture showed no growth. Cytology was negative for high grade urothelial carcinoma. As of 6/17/23 Prostate MRI results are good. Prostate evaluation shows PRADS 1 which means a very low chance of clinically significant prostate cancer. Prostate Volume: 153 mL PSA density: 0.03 ng/mL/mL. No prostate biopsy is needed at this time. Patient states feeling well and offers no new complaints. Urination is good as he has attempted to increase water consumption. Occasional Nocturia 1 and urgency when he drinks a surplus of water and minimal leakage before reaching the bathroom. Patient denies dysuria, gross hematuria, burning, frequency, pushing, straining, or incontinence. The patient continues to take Finasteride. Patient has discontinued Aspirin due to bleeding complications. Denies Fhx of heart disease. Had a high calcium score. Denies ever having chest pains. On Diltiazem 240 mg once daily to control rate of Heart beat due to having Afib.

## 2023-06-27 NOTE — ADDENDUM
[FreeTextEntry1] : This note was authored by Christi Pereira working as a scribe for Dr.Gary Yee. I, Dr. Ramiro Yee have reviewed the content of this note and confirm it is true and accurate. I personally performed the history and physical examination and made all the decisions 06/27/2023\par

## 2023-06-27 NOTE — PHYSICAL EXAM
[General Appearance - Well Developed] : well developed [General Appearance - Well Nourished] : well nourished [Normal Appearance] : normal appearance [Well Groomed] : well groomed [General Appearance - In No Acute Distress] : no acute distress [] : no respiratory distress [Respiration, Rhythm And Depth] : normal respiratory rhythm and effort [Exaggerated Use Of Accessory Muscles For Inspiration] : no accessory muscle use [Affect] : the affect was normal [Oriented To Time, Place, And Person] : oriented to person, place, and time [Mood] : the mood was normal [Not Anxious] : not anxious

## 2023-07-05 ENCOUNTER — CLINICAL ADVICE (OUTPATIENT)
Age: 58
End: 2023-07-05

## 2023-07-05 ENCOUNTER — NON-APPOINTMENT (OUTPATIENT)
Age: 58
End: 2023-07-05

## 2023-07-05 LAB — POTASSIUM SERPL-SCNC: 5.6 MMOL/L

## 2023-11-15 ENCOUNTER — APPOINTMENT (OUTPATIENT)
Dept: INTERNAL MEDICINE | Facility: CLINIC | Age: 58
End: 2023-11-15
Payer: COMMERCIAL

## 2023-11-15 VITALS
WEIGHT: 192 LBS | BODY MASS INDEX: 30.13 KG/M2 | SYSTOLIC BLOOD PRESSURE: 125 MMHG | DIASTOLIC BLOOD PRESSURE: 72 MMHG | HEIGHT: 67 IN | HEART RATE: 76 BPM

## 2023-11-15 PROCEDURE — G0008: CPT

## 2023-11-15 PROCEDURE — 90686 IIV4 VACC NO PRSV 0.5 ML IM: CPT

## 2023-11-15 PROCEDURE — 36415 COLL VENOUS BLD VENIPUNCTURE: CPT

## 2023-11-15 PROCEDURE — 99214 OFFICE O/P EST MOD 30 MIN: CPT | Mod: 25

## 2023-11-16 ENCOUNTER — CLINICAL ADVICE (OUTPATIENT)
Age: 58
End: 2023-11-16

## 2023-11-16 LAB
ALBUMIN SERPL ELPH-MCNC: 4.8 G/DL
ALP BLD-CCNC: 48 U/L
ALT SERPL-CCNC: 21 U/L
ANION GAP SERPL CALC-SCNC: 11 MMOL/L
AST SERPL-CCNC: 19 U/L
BASOPHILS # BLD AUTO: 0.04 K/UL
BASOPHILS NFR BLD AUTO: 0.6 %
BILIRUB DIRECT SERPL-MCNC: 0.2 MG/DL
BILIRUB INDIRECT SERPL-MCNC: 0.4 MG/DL
BILIRUB SERPL-MCNC: 0.6 MG/DL
BUN SERPL-MCNC: 17 MG/DL
CALCIUM SERPL-MCNC: 9.6 MG/DL
CHLORIDE SERPL-SCNC: 105 MMOL/L
CHOLEST SERPL-MCNC: 127 MG/DL
CO2 SERPL-SCNC: 24 MMOL/L
CREAT SERPL-MCNC: 0.95 MG/DL
EGFR: 92 ML/MIN/1.73M2
EOSINOPHIL # BLD AUTO: 0.08 K/UL
EOSINOPHIL NFR BLD AUTO: 1.2 %
ESTIMATED AVERAGE GLUCOSE: 114 MG/DL
GLUCOSE SERPL-MCNC: 87 MG/DL
HBA1C MFR BLD HPLC: 5.6 %
HCT VFR BLD CALC: 42.7 %
HDLC SERPL-MCNC: 52 MG/DL
HGB BLD-MCNC: 14 G/DL
IMM GRANULOCYTES NFR BLD AUTO: 0.3 %
LDLC SERPL CALC-MCNC: 63 MG/DL
LYMPHOCYTES # BLD AUTO: 2 K/UL
LYMPHOCYTES NFR BLD AUTO: 31.1 %
MAN DIFF?: NORMAL
MCHC RBC-ENTMCNC: 32 PG
MCHC RBC-ENTMCNC: 32.8 GM/DL
MCV RBC AUTO: 97.5 FL
MONOCYTES # BLD AUTO: 0.42 K/UL
MONOCYTES NFR BLD AUTO: 6.5 %
NEUTROPHILS # BLD AUTO: 3.87 K/UL
NEUTROPHILS NFR BLD AUTO: 60.3 %
NONHDLC SERPL-MCNC: 75 MG/DL
PLATELET # BLD AUTO: 201 K/UL
POTASSIUM SERPL-SCNC: 5.2 MMOL/L
PROT SERPL-MCNC: 7.1 G/DL
RBC # BLD: 4.38 M/UL
RBC # FLD: 12.8 %
SODIUM SERPL-SCNC: 141 MMOL/L
TRIGL SERPL-MCNC: 55 MG/DL
TSH SERPL-ACNC: 0.99 UIU/ML
WBC # FLD AUTO: 6.43 K/UL

## 2024-02-26 ENCOUNTER — RX RENEWAL (OUTPATIENT)
Age: 59
End: 2024-02-26

## 2024-02-26 RX ORDER — ATORVASTATIN CALCIUM 40 MG/1
40 TABLET, FILM COATED ORAL DAILY
Qty: 90 | Refills: 2 | Status: ACTIVE | COMMUNITY
Start: 2020-12-11 | End: 1900-01-01

## 2024-03-05 ENCOUNTER — NON-APPOINTMENT (OUTPATIENT)
Age: 59
End: 2024-03-05

## 2024-03-05 ENCOUNTER — APPOINTMENT (OUTPATIENT)
Dept: INTERNAL MEDICINE | Facility: CLINIC | Age: 59
End: 2024-03-05
Payer: COMMERCIAL

## 2024-03-05 VITALS
DIASTOLIC BLOOD PRESSURE: 76 MMHG | HEART RATE: 69 BPM | SYSTOLIC BLOOD PRESSURE: 135 MMHG | BODY MASS INDEX: 29.82 KG/M2 | HEIGHT: 67 IN | WEIGHT: 190 LBS

## 2024-03-05 DIAGNOSIS — E78.5 HYPERLIPIDEMIA, UNSPECIFIED: ICD-10-CM

## 2024-03-05 DIAGNOSIS — I48.0 PAROXYSMAL ATRIAL FIBRILLATION: ICD-10-CM

## 2024-03-05 DIAGNOSIS — R93.1 ABNORMAL FINDINGS ON DIAGNOSTIC IMAGING OF HEART AND CORONARY CIRCULATION: ICD-10-CM

## 2024-03-05 DIAGNOSIS — Z00.00 ENCOUNTER FOR GENERAL ADULT MEDICAL EXAMINATION W/OUT ABNORMAL FINDINGS: ICD-10-CM

## 2024-03-05 PROCEDURE — 36415 COLL VENOUS BLD VENIPUNCTURE: CPT

## 2024-03-05 PROCEDURE — 93000 ELECTROCARDIOGRAM COMPLETE: CPT

## 2024-03-05 PROCEDURE — 99396 PREV VISIT EST AGE 40-64: CPT

## 2024-03-05 NOTE — HEALTH RISK ASSESSMENT
[Good] : ~his/her~  mood as  good [No falls in past year] : Patient reported no falls in the past year [PHQ-2 Negative - No further assessment needed] : PHQ-2 Negative - No further assessment needed [0] : 2) Feeling down, depressed, or hopeless: Not at all (0) [Former] : Former [15-19] : 15-19 [< 15 Years] : < 15 Years [[id number]] : 0 [Patient reported colonoscopy was normal] : Patient reported colonoscopy was normal [Change in mental status noted] : No change in mental status noted [ColonoscopyDate] : 2016

## 2024-03-05 NOTE — ASSESSMENT
[FreeTextEntry1] : //  CAD, hyperlipidemia. Improved LDL in 3/2021. CT calcium score completed, score -204, h/o HLD, currently on statin and aspirin 81 mg daily. Cholesterol levels improved last month done by cardiologist. Was advised to decrease dose at that time. -cont atorvastatin 40 mg daily -Educated of the importance of Healthy diet, such as Mediterranean Diet and Exercise, such as walking >20 minutes a day and increasing gradually as tolerated  Paroxysmal afib on asa 325mg/ccb. Having less frequently however episodes of which he monitors on his apple watch. -cont cardizem as directed -restart asprin 81mg (rectal bleeding on 325mg) -f/u as per cardio   h/o elevated PSA, was recently seen by urologist. Rectal exam-normal. MRI prostate-normal. Still having nocturia, several times a night. Unable to tolerate Flomax -Seen by urology,  -on finasteride  Nicotine dependence, now vaping occasionally, but greatly reduced after starting nicotine replacements. -Congratulated and advised to refrain completely  MUNA on CPAP. follows with Dr. [facility]  Healthcare Maintenance -Advise Yearly Skin cancer screening with Dermatologist  -Advise Yearly Eye exam with Ophthalmologist -Advise Yearly Dental exam -Educated of the importance of Healthy diet, such as Mediterranean Diet and Exercise, such as walking >20 minutes a day and increasing gradually as tolerated  Immunizations -Flu vaccine  -Covid vaccine  -Discussed shingles vaccine (shingrix), advised to verify with insurance if its covered through medical or prescription plan  Preventative screening  -advised to get colonoscopy for colon cancer screening -up to date  -will check PSA for prostate cancer screening  -labs drawn

## 2024-03-05 NOTE — HISTORY OF PRESENT ILLNESS
[de-identified] : 59 y/o male with h/o Paroxysmal Afib on asa 325mg/ccb, osteoarthritis and hyperlipidemia presents for annual exam.    Will be going for repeat colonoscopy in the next couple months  overall is doing well.   compliant with meds, no SE still vaping somewhat at times. no cig smoking  Noted intentional weight loss due to healthy diet, exercise.      employed  former smoker

## 2024-03-05 NOTE — PHYSICAL EXAM
[Pedal Pulses Present] : the pedal pulses are present [No Edema] : there was no peripheral edema [Normal] : soft, non-tender, non-distended, no masses palpated, no HSM and normal bowel sounds [Normal Posterior Cervical Nodes] : no posterior cervical lymphadenopathy [Normal Anterior Cervical Nodes] : no anterior cervical lymphadenopathy [No CVA Tenderness] : no CVA  tenderness [No Joint Swelling] : no joint swelling [No Rash] : no rash [No Focal Deficits] : no focal deficits [Normal Affect] : the affect was normal [Normal Mood] : the mood was normal

## 2024-03-06 ENCOUNTER — CLINICAL ADVICE (OUTPATIENT)
Age: 59
End: 2024-03-06

## 2024-03-06 LAB
ALBUMIN SERPL ELPH-MCNC: 4.5 G/DL
ALP BLD-CCNC: 48 U/L
ALT SERPL-CCNC: 15 U/L
ANION GAP SERPL CALC-SCNC: 13 MMOL/L
APPEARANCE: CLEAR
AST SERPL-CCNC: 17 U/L
BACTERIA: NEGATIVE /HPF
BASOPHILS # BLD AUTO: 0.02 K/UL
BASOPHILS NFR BLD AUTO: 0.4 %
BILIRUB DIRECT SERPL-MCNC: 0.2 MG/DL
BILIRUB INDIRECT SERPL-MCNC: 0.4 MG/DL
BILIRUB SERPL-MCNC: 0.6 MG/DL
BILIRUBIN URINE: NEGATIVE
BLOOD URINE: NEGATIVE
BUN SERPL-MCNC: 16 MG/DL
CALCIUM SERPL-MCNC: 9.1 MG/DL
CAST: 0 /LPF
CHLORIDE SERPL-SCNC: 103 MMOL/L
CHOLEST SERPL-MCNC: 128 MG/DL
CO2 SERPL-SCNC: 24 MMOL/L
COLOR: YELLOW
CREAT SERPL-MCNC: 0.94 MG/DL
EGFR: 93 ML/MIN/1.73M2
EOSINOPHIL # BLD AUTO: 0.09 K/UL
EOSINOPHIL NFR BLD AUTO: 1.6 %
EPITHELIAL CELLS: 0 /HPF
ESTIMATED AVERAGE GLUCOSE: 108 MG/DL
FERRITIN SERPL-MCNC: 108 NG/ML
FOLATE SERPL-MCNC: 12.8 NG/ML
GLUCOSE QUALITATIVE U: NEGATIVE MG/DL
GLUCOSE SERPL-MCNC: 90 MG/DL
HBA1C MFR BLD HPLC: 5.4 %
HCT VFR BLD CALC: 43.6 %
HDLC SERPL-MCNC: 55 MG/DL
HGB BLD-MCNC: 14.3 G/DL
IMM GRANULOCYTES NFR BLD AUTO: 0.2 %
IRON SATN MFR SERPL: 39 %
IRON SERPL-MCNC: 110 UG/DL
KETONES URINE: NEGATIVE MG/DL
LDLC SERPL CALC-MCNC: 62 MG/DL
LEUKOCYTE ESTERASE URINE: NEGATIVE
LYMPHOCYTES # BLD AUTO: 1.86 K/UL
LYMPHOCYTES NFR BLD AUTO: 32.7 %
MAN DIFF?: NORMAL
MCHC RBC-ENTMCNC: 30.8 PG
MCHC RBC-ENTMCNC: 32.8 GM/DL
MCV RBC AUTO: 93.8 FL
MICROSCOPIC-UA: NORMAL
MONOCYTES # BLD AUTO: 0.43 K/UL
MONOCYTES NFR BLD AUTO: 7.6 %
NEUTROPHILS # BLD AUTO: 3.28 K/UL
NEUTROPHILS NFR BLD AUTO: 57.5 %
NITRITE URINE: NEGATIVE
NONHDLC SERPL-MCNC: 73 MG/DL
PH URINE: 6.5
PLATELET # BLD AUTO: 203 K/UL
POTASSIUM SERPL-SCNC: 4.6 MMOL/L
PROT SERPL-MCNC: 6.9 G/DL
PROTEIN URINE: NEGATIVE MG/DL
RBC # BLD: 4.65 M/UL
RBC # FLD: 12.5 %
RED BLOOD CELLS URINE: 1 /HPF
SODIUM SERPL-SCNC: 140 MMOL/L
SPECIFIC GRAVITY URINE: 1.03
TIBC SERPL-MCNC: 279 UG/DL
TRIGL SERPL-MCNC: 48 MG/DL
TSH SERPL-ACNC: 0.95 UIU/ML
UIBC SERPL-MCNC: 169 UG/DL
UROBILINOGEN URINE: 1 MG/DL
VIT B12 SERPL-MCNC: 406 PG/ML
WBC # FLD AUTO: 5.69 K/UL
WHITE BLOOD CELLS URINE: 0 /HPF

## 2024-03-20 LAB — PSA SERPL-MCNC: 5.56 NG/ML

## 2024-03-25 ENCOUNTER — APPOINTMENT (OUTPATIENT)
Dept: UROLOGY | Facility: CLINIC | Age: 59
End: 2024-03-25
Payer: COMMERCIAL

## 2024-03-25 VITALS
OXYGEN SATURATION: 96 % | TEMPERATURE: 98.2 F | DIASTOLIC BLOOD PRESSURE: 80 MMHG | HEART RATE: 61 BPM | SYSTOLIC BLOOD PRESSURE: 137 MMHG | RESPIRATION RATE: 16 BRPM

## 2024-03-25 DIAGNOSIS — R97.20 ELEVATED PROSTATE, SPECIFIC ANTIGEN [PSA]: ICD-10-CM

## 2024-03-25 DIAGNOSIS — G47.33 OBSTRUCTIVE SLEEP APNEA (ADULT) (PEDIATRIC): ICD-10-CM

## 2024-03-25 DIAGNOSIS — F17.200 NICOTINE DEPENDENCE, UNSPECIFIED, UNCOMPLICATED: ICD-10-CM

## 2024-03-25 DIAGNOSIS — N40.1 BENIGN PROSTATIC HYPERPLASIA WITH LOWER URINARY TRACT SYMPMS: ICD-10-CM

## 2024-03-25 DIAGNOSIS — R35.0 FREQUENCY OF MICTURITION: ICD-10-CM

## 2024-03-25 DIAGNOSIS — R35.1 BENIGN PROSTATIC HYPERPLASIA WITH LOWER URINARY TRACT SYMPMS: ICD-10-CM

## 2024-03-25 PROCEDURE — 99214 OFFICE O/P EST MOD 30 MIN: CPT

## 2024-03-25 RX ORDER — FINASTERIDE 5 MG/1
5 TABLET, FILM COATED ORAL DAILY
Qty: 90 | Refills: 3 | Status: ACTIVE | COMMUNITY
Start: 2021-11-03 | End: 1900-01-01

## 2024-03-25 NOTE — ADDENDUM
[FreeTextEntry1] : This note was authored by Cary Martinez working as a scribe for Dr.Gary Yee. I, Dr. Ramiro Yee have reviewed the content of this note and confirm it is true and accurate. I personally performed the history and physical examination and made all the decisions 03/25/2024

## 2024-03-25 NOTE — PHYSICAL EXAM
[Normal Appearance] : normal appearance [Well Groomed] : well groomed [General Appearance - In No Acute Distress] : no acute distress [Edema] : no peripheral edema [Respiration, Rhythm And Depth] : normal respiratory rhythm and effort [Exaggerated Use Of Accessory Muscles For Inspiration] : no accessory muscle use [Abdomen Soft] : soft [Costovertebral Angle Tenderness] : no ~M costovertebral angle tenderness [Abdomen Tenderness] : non-tender [Normal Station and Gait] : the gait and station were normal for the patient's age [No Focal Deficits] : no focal deficits [] : no rash [Oriented To Time, Place, And Person] : oriented to person, place, and time [Affect] : the affect was normal [Mood] : the mood was normal [de-identified] : The knee-chest position was utilized for the PATI. Digital rectal exam found no suspicious rectal masses. Small non inflamed hemorrhoid at 11 oclock position. Normal seminal vesicles. Anal tone is normal. The prostate is non tender, with normal texture, discrete borders, and no nodules. It is a 25 gram transurethral resection size. No rectal mucosal lesions. No gross blood on the examining finger.

## 2024-03-25 NOTE — ASSESSMENT
[FreeTextEntry1] : 01/13/2021: Mr. Garcia is a 58y/o M presenting today for evaluation of elevated PSA. H/o BPH. Previously saw Dr. Finnegan at [facility]. Had one episode of gross hematuria that has resolved. 12/11/20 PSA was 11.3, which is decreased from 15.50 on 9/4/19. Wakes up 2-3x at night to urinate but does not need medication. Denies urinary urgency, pushing or straining. Urinates 5-6x during the day to urinate. Sexual function is good. Some constipation and takes Metamucil. No asthma, diabetes, or seizures. PSHx of neck operation 4 years ago and hip operation. Left hand weakness due to neck pain. No FHx of prostate ca or kidney stones.  with two children. Current smoker of one pack a day for 35 years, but trying to quit.   11/03/2021: Patient presents today for follow up. Urination is unchanged. Urinates 10-15x during the day. Drinks about 2 quarts of fluids a day. Drinks 1 cup of green tea a day. Wakes 2-4x at inght, although unsure if it is due to urination or sleep apnea which he think she may have. Had prostate MRI 2/13/21 for elevated PSA which was read as PIRADS 2, 173 cc prostate gland. Has some urinary hesitancy after waking up, otherwise normal throughout the day. No urinary urgency but urinates more often when he has caffeine. Had noticed one time where he urinated one drop urine, but had it after the biopsy which was negative. Had tried Tamsulosin the past, but did not feel well on it with side effects of headache, stomach ache, and difficulty sleeping. States he possibly has sleep apnea. Admits feeling tired during the day. Has had hip and knee surgery. Has tried taking half of marijuana gummy before bed after which he only woke up once. Had palpitations when he took a whole one. Erections are good. Will be going to [place] next week.   Digital rectal exam found no suspicious rectal masses. No rectal mucosal lesions. Anal tone is normal. The prostate is non tender, with normal texture, discrete borders, and no nodules that I could feel. Could only feel up to about the midgland. It is over 100 gram transurethral resection size. No gross blood on examining fingers.  I prescribed the patient Finasteride 5 mg to take one tablet daily for BPH symptoms. I informed the patient there may be erectile dysfunction, decreased libido, breast tenderness, and hot flashes as a side effect. I explained that there is a 25% reduction of prostate cancer.  I referred the patient to pulmonologist Dr. Pete Galvin for sleep study to see if he may need CPAP machine.  Blood work today included BMP, alkaline phosphatase, PSA, and testosterone.  The patient produced a urine sample which will be sent for urinalysis, urine cytology, and urine culture.  RTO in 3 months for follow up or sooner if new urinary symptoms develop.   05/23/2022:  presents today for a follow up. He reports that he has an appt with a pulmonologist for his sleep apnea at the end of June. Has been taking finasteride 5 mg once daily which he noticed has shown some slight improvement. Nocturia ranges from 1-2x to 4x. Daytime urination is fairly frequent, voids about 10x a day which is annoying to the patient. The more he sleeps the weaker his stream is in the morning. Has a fairly good stream throughout the day. Denies any dysuria, burning, and gross hematuria.   Reviewed MRI of the prostate once again from 2/13/2021.  Advised the patient to decrease his fluid consumption towards the night time to decrease nocturia.  I prescribed the patient Tolterodine 2 mg, one tablet every afternoon around lunch time. I informed the patient that they might experience constipation as a side effect. He will continue finasteride 5 mg once daily.   11/15/2022: Mr. MYRNA GARCIA presents today for a follow up audio only telephone visit for which he gave permission for. Patient discontinued use of tolterodine due to side effects. Patient is maintained on Finasteride 5 MG. He reports beginning use of CPAP 1 month ago for sleep apnea, reports decrease in nocturia, and now feels well rested in the mornings as he is not waking up every hour to urinate. Patient reports a frequency of 8 times a day with a fluid intake of 4 bottles of water - patient is not bothered by his daytime frequency. He denies dysuria and gross hematuria. PSA was 8.5 on 02/08/2022. Patient reports his erections and sexual desire are good. Patient has quit smoking in 05/2022.  Reviewed and discussed patient's laboratory work from 02/08/2022 in detail with the patient.  Will send patient to nearest [facility] lab for blood work and urine testing, ordered today.  I refilled patient's prescription of Finasteride 5 MG. Patient will RTO 1-2 months in office.  04/19/2023: Mr. MYRNA GARCIA presents today for a follow up. Patient was last seen by me in November 2022. Has been on finasteride 5 mg once daily since November 2021. 11/3/2021 PSA was 11.1. PSA done 4/6/2023 in advance of today's visit showed his PSA has declined from 15.5 and now is 4.56. 15.5 was in September 2019. The decline from 15.5 to 11 was due to antibiotics. Pt tried tamsulosin to improve urination. Tamsulosin did not work for the patient. He then tried tolterodine which did not help. His urination improved significantly on finasteride. He denies excessive urination at night, urinary urgency, frequency, pushing, straining, gross hematuria, and burning on urination. Sexual function remains good.    The knee-chest position was utilized for the PATI. Digital rectal exam found no suspicious rectal masses. Normal seminal vesicles. Anal tone is normal. The prostate is non tender, with normal texture, discrete borders, and no nodules. It is a 30 gram transurethral resection size. No rectal mucosal lesions. No gross blood on the examining finger.   Reviewed lab work of 4/6/2023 in detail with the patient.  The patient produced a urine sample which will be sent for urinalysis, urine cytology, and urine culture.  I am pleased that the patient is doing well. PSA without finasteride would probably be about 9.12. This is low for him. He did have an MRI of the prostate on 2/13/2021 which was a PIRADS-2. Given all this together, I suspect the patient does not have prostate cancer and does not need an MRI again at this time but will continue to observe carefully and will change plans if things arise.  Patient will return in 6 months, sooner if clinically indicated.   06/27/2023: Mr. GARCIA presents today for a follow up audio visual telehealth visit for which they gave permission for. The patient was located at home [address] [place] [id number] and I was located in my office in [place], [place]. His 4/19/23 A showed pt should drikn more water as appearance is Turbid, specific gravity is elevated at 1.031. Culture showed no growth. Cytology was negative for high grade urothelial carcinoma. As of 6/17/23 Prostate MRI results are good. Prostate evaluation shows PRADS 1 which means a very low chance of clinically significant prostate cancer. Prostate Volume: 153 mL PSA density: 0.03 ng/mL/mL. No prostate biopsy is needed at this time. Patient states feeling well and offers no new complaints. Urination is good as he has attempted to increase water consumption. Occasional Nocturia 1 and urgency when he drinks a surplus of water and minimal leakage before reaching the bathroom. Patient denies dysuria, gross hematuria, burning, frequency, pushing, straining, or incontinence. The patient continues to take Finasteride. Patient has discontinued Aspirin due to bleeding complications. Denies Fhx of heart disease. Had a high calcium score. Denies ever having chest pains. On Diltiazem 240 mg once daily to control rate of Heart beat due to having Afib.   Reviewed and discussed laboratory work of 4/19/23 which He  obtained prior to today's visit as requested. I have referred the patient to Dr. Patrice Monsivais. Patient will continue Aspirin as he has not have approval to d/o it from his cardiologist.  Patient should decrease orange juice intake as Potassium is elevated.  Pt will RTO in 3 months. Prior, Pt will go to his nearest [facility] lab for blood work including BMP, alkaline phosphatase, PSA, and testosterone and a urine sample which will be sent for urinalysis, urine culture, and urine cytology.   03/25/2024: Mr. MYRNA GARCIA presents today for a follow up. He reports he has been doing well. He does inquire the name of a new cardiologist as he is not happy with his current one. I provided him with the name of Dr.Jerome Monsivais. Does continue to follow with Dr.Aldo Wagoner for primary care. Pt has reduced his aspirin dose from 325 mg to 81 mg daily due to bleeding from the rectum. He informs me that  is aware of this change. Previously saw . Patient continues to take finasteride 5 mg once daily. He denies any nocturia. Is using a CPAP machine, follows with a sleep doctor. Slept 6 hours straight last night. Pt reports occasional urinary urgency if he drinks coffee. Will leak a few drops if he has urgency and waits a long time, however he reports this rarely happens. Has a little hesitancy and slow stream at nighttime. Stream during the day is fairly good. Denies gross hematuria and burning on urination. Sexual function remains adequate enough for penetration. Pt quit smoking 2 years ago in May 2022. Does continue to vape sometimes. Also uses patches.   The knee-chest position was utilized for the PATI. Digital rectal exam found no suspicious rectal masses. Small non inflamed hemorrhoid at 11 oclock position. Normal seminal vesicles. Anal tone is normal. The prostate is non tender, with normal texture, discrete borders, and no nodules. It is a 25 gram transurethral resection size. No rectal mucosal lesions. No gross blood on the examining finger.   Reviewed and discussed lab work of 3/5/2024 ordered by . Patient while on finasteride with a PSA of 4.56 had an MRI of the prostate pre and post IV gadolinium on February 23, 2021 the prostate was 153 cm. The PI-RADS score was 1. PSA of March 5, 2024 was 5.56. This is less and PSA February 8, 2022 which was 8.53 or of February 14, 2023 was 5.20 I believe we can watch the current PSA.  The patient produced a urine sample which will be sent for urinalysis, urine cytology, and urine culture.   Blood work today included Alk phos, BMP, dihydrotestosterone, PSA profile, SHBG, & testosterone free and total.   Renewed finasteride 5 mg once daily.    Patient provided with Dr.Jerome Monsivais's information to schedule an appt.   Counseled the patient on smoking cessation. I explained that if he feels the need for nicotine, he should use the patches or gum, as they are better for him than vaping as this can damage his lungs just as much as smoking regular cigarettes.  Patient will RTO in 6 months for reassessment, sooner if clinically indicated.    Preparation, in person office visit, and coordination of care took 30 minutes.

## 2024-03-25 NOTE — HISTORY OF PRESENT ILLNESS
[FreeTextEntry1] : 01/13/2021: Mr. Garcia is a 56y/o M presenting today for evaluation of elevated PSA. H/o BPH. Previously saw Dr. Finnegan at [facility]. Had one episode of gross hematuria that has resolved. 12/11/20 PSA was 11.3, which is decreased from 15.50 on 9/4/19. Wakes up 2-3x at night to urinate but does not need medication. Denies urinary urgency, pushing or straining. Urinates 5-6x during the day to urinate. Sexual function is good. Some constipation and takes Metamucil. No asthma, diabetes, or seizures. PSHx of neck operation 4 years ago and hip operation. Left hand weakness due to neck pain. No FHx of prostate ca or kidney stones.  with two children. Current smoker of one pack a day for 35 years, but trying to quit.   11/03/2021: Patient presents today for follow up. Urination is unchanged. Urinates 10-15x during the day. Drinks about 2 quarts of fluids a day. Drinks 1 cup of green tea a day. Wakes 2-4x at inght, although unsure if it is due to urination or sleep apnea which he think she may have. Had prostate MRI 2/13/21 for elevated PSA which was read as PIRADS 2, 173 cc prostate gland. Has some urinary hesitancy after waking up, otherwise normal throughout the day. No urinary urgency but urinates more often when he has caffeine. Had noticed one time where he urinated one drop urine, but had it after the biopsy which was negative. Had tried Tamsulosin the past, but did not feel well on it with side effects of headache, stomach ache, and difficulty sleeping. States he possibly has sleep apnea. Admits feeling tired during the day. Has had hip and knee surgery. Has tried taking half of marijuana gummy before bed after which he only woke up once. Had palpitations when he took a whole one. Erections are good. Will be going to [place] next week.   05/23/2022:  presents today for a follow up. He reports that he has an appt with a pulmonologist for his sleep apnea at the end of June. Has been taking finasteride 5 mg once daily which he noticed has shown some slight improvement. Nocturia ranges from 1-2x to 4x. Daytime urination is fairly frequent, voids about 10x a day which is annoying to the patient. The more he sleeps the weaker his stream is in the morning. Has a fairly good stream throughout the day. Denies any dysuria, burning, and gross hematuria.   11/15/2022: Mr. MYRNA GARCIA presents today for a follow up audio only telephone visit for which he gave permission for. Patient discontinued use of tolterodine due to side effects. Patient is maintained on Finasteride 5 MG. He reports beginning use of CPAP 1 month ago for sleep apnea, reports decrease in nocturia, and now feels well rested in the mornings as he is not waking up every hour to urinate. Patient reports a frequency of 8 times a day with a fluid intake of 4 bottles of water - patient is not bothered by his daytime frequency. He denies dysuria and gross hematuria. PSA was 8.5 on 02/08/2022. Patient reports his erections and sexual desire are good. Patient has quit smoking in 05/2022.  04/19/2023: Mr. MYRNA GARCIA presents today for a follow up. Patient was last seen by me in November 2022. Has been on finasteride 5 mg once daily since November 2021. 11/3/2021 PSA was 11.1. PSA done 4/6/2023 in advance of today's visit showed his PSA has declined from 15.5 and now is 4.56. 15.5 was in September 2019. The decline from 15.5 to 11 was due to antibiotics. Pt tried tamsulosin to improve urination. Tamsulosin did not work for the patient. He then tried tolterodine which did not help. His urination improved significantly on finasteride. He denies excessive urination at night, urinary urgency, frequency, pushing, straining, gross hematuria, and burning on urination. Sexual function remains good.    06/27/2023: Mr. GARCIA presents today for a follow up audio visual telehealth visit for which they gave permission for. The patient was located at home [address] [place] [address] and I was located in my office in [place], [place]. His 4/19/23 A showed pt should drikn more water as appearance is Turbid, specific gravity is elevated at 1.031. Culture showed no growth. Cytology was negative for high grade urothelial carcinoma. As of 6/17/23 Prostate MRI results are good. Prostate evaluation shows PRADS 1 which means a very low chance of clinically significant prostate cancer. Prostate Volume: 153 mL PSA density: 0.03 ng/mL/mL. No prostate biopsy is needed at this time. Patient states feeling well and offers no new complaints. Urination is good as he has attempted to increase water consumption. Occasional Nocturia 1 and urgency when he drinks a surplus of water and minimal leakage before reaching the bathroom. Patient denies dysuria, gross hematuria, burning, frequency, pushing, straining, or incontinence. The patient continues to take Finasteride. Patient has discontinued Aspirin due to bleeding complications. Denies Fhx of heart disease. Had a high calcium score. Denies ever having chest pains. On Diltiazem 240 mg once daily to control rate of Heart beat due to having Afib.   03/25/2024: Mr. MYRNA GARCIA presents today for a follow up. He reports he has been doing well. He does inquire the name of a new cardiologist as he is not happy with his current one. I provided him with the name of Dr.Jerome Monsivais. Does continue to follow with Dr.Aldo Wagoner for primary care. Pt has reduced his aspirin dose from 325 mg to 81 mg daily due to bleeding from the rectum. He informs me that  is aware of this change. Previously saw . Patient continues to take finasteride 5 mg once daily. He denies any nocturia. Is using a CPAP machine, follows with a sleep doctor. Slept 6 hours straight last night. Pt reports occasional urinary urgency if he drinks coffee. Will leak a few drops if he has urgency and waits a long time, however he reports this rarely happens. Has a little hesitancy and slow stream at nighttime. Stream during the day is fairly good. Denies gross hematuria and burning on urination. Sexual function remains adequate enough for penetration. Pt quit smoking 2 years ago in May 2022. Does continue to vape sometimes. Also uses patches.

## 2024-03-26 LAB
ALP BLD-CCNC: 49 U/L
ANION GAP SERPL CALC-SCNC: 10 MMOL/L
APPEARANCE: CLEAR
BACTERIA: NEGATIVE /HPF
BILIRUBIN URINE: NEGATIVE
BLOOD URINE: NEGATIVE
BUN SERPL-MCNC: 18 MG/DL
CALCIUM SERPL-MCNC: 9.5 MG/DL
CAST: 0 /LPF
CHLORIDE SERPL-SCNC: 103 MMOL/L
CO2 SERPL-SCNC: 27 MMOL/L
COLOR: YELLOW
CREAT SERPL-MCNC: 0.93 MG/DL
EGFR: 94 ML/MIN/1.73M2
EPITHELIAL CELLS: 0 /HPF
GLUCOSE QUALITATIVE U: NEGATIVE MG/DL
GLUCOSE SERPL-MCNC: 100 MG/DL
KETONES URINE: NEGATIVE MG/DL
LEUKOCYTE ESTERASE URINE: NEGATIVE
MICROSCOPIC-UA: NORMAL
NITRITE URINE: NEGATIVE
PH URINE: 7
POTASSIUM SERPL-SCNC: 4.5 MMOL/L
PROTEIN URINE: NEGATIVE MG/DL
PSA FREE FLD-MCNC: 17 %
PSA FREE SERPL-MCNC: 0.9 NG/ML
PSA SERPL-MCNC: 5.41 NG/ML
RED BLOOD CELLS URINE: 1 /HPF
SHBG SERPL-SCNC: 37.4 NMOL/L
SODIUM SERPL-SCNC: 140 MMOL/L
SPECIFIC GRAVITY URINE: 1.02
UROBILINOGEN URINE: 1 MG/DL
WHITE BLOOD CELLS URINE: 0 /HPF

## 2024-03-27 LAB — BACTERIA UR CULT: NORMAL

## 2024-03-30 LAB
TESTOST FREE SERPL-MCNC: 13.3 PG/ML
TESTOST SERPL-MCNC: 630 NG/DL
URINE CYTOLOGY: NORMAL

## 2024-03-31 LAB — DHEA SERPL-MCNC: 258 NG/DL

## 2024-08-07 ENCOUNTER — APPOINTMENT (OUTPATIENT)
Dept: INTERNAL MEDICINE | Facility: CLINIC | Age: 59
End: 2024-08-07

## 2024-08-07 PROCEDURE — 36415 COLL VENOUS BLD VENIPUNCTURE: CPT

## 2024-08-07 PROCEDURE — G2211 COMPLEX E/M VISIT ADD ON: CPT | Mod: NC

## 2024-08-07 PROCEDURE — 99214 OFFICE O/P EST MOD 30 MIN: CPT

## 2024-08-07 NOTE — PHYSICAL EXAM
[Pedal Pulses Present] : the pedal pulses are present [No Edema] : there was no peripheral edema [Normal] : soft, non-tender, non-distended, no masses palpated, no HSM and normal bowel sounds [Normal Posterior Cervical Nodes] : no posterior cervical lymphadenopathy [Normal Anterior Cervical Nodes] : no anterior cervical lymphadenopathy [No Focal Deficits] : no focal deficits [Normal Affect] : the affect was normal [Normal Mood] : the mood was normal

## 2024-08-07 NOTE — HISTORY OF PRESENT ILLNESS
[de-identified] : 57 y/o male with h/o Paroxysmal Afib on asa 325mg/ccb, osteoarthritis and hyperlipidemia presents for follow-up   overall is doing well.   compliant with meds, no SE still vaping somewhat at times. no cig smoking  Noted intentional weight loss due to healthy diet, exercise.      employed  former smoker

## 2024-08-07 NOTE — ASSESSMENT
[FreeTextEntry1] : //  CAD, hyperlipidemia. Improved LDL in 3/2021. CT calcium score completed, score -204, h/o HLD, currently on statin and aspirin 81 mg daily. Cholesterol levels improved last month done by cardiologist. Was advised to decrease dose at that time. -continue atorvastatin 40 mg daily -Educated of the importance of Healthy diet, such as Mediterranean Diet and Exercise, such as walking >20 minutes a day and increasing gradually as tolerated  Paroxysmal Afib on asa 325mg/ccb. Having less frequently however episodes of which he monitors on his apple watch. -continue cardizem as directed -continue asprin 81mg (rectal bleeding on 325mg) -f/u as per cardio   h/o elevated PSA, was recently seen by urologist. Rectal exam-normal. MRI prostate-normal. Still having nocturia, several times a night. Unable to tolerate Flomax -Seen by urology,  -on finasteride  Nicotine dependence, now vaping occasionally, but greatly reduced after starting nicotine replacements. -Congratulated and advised to refrain completely  MUNA on CPAP. follows with Dr. [facility]  Healthcare Maintenance -Advise Yearly Skin cancer screening with Dermatologist  -Advise Yearly Eye exam with Ophthalmologist -Advise Yearly Dental exam -Educated of the importance of Healthy diet, such as Mediterranean Diet and Exercise, such as walking >20 minutes a day and increasing gradually as tolerated  Immunizations -Flu vaccine  -Covid vaccine  -Discussed shingles vaccine (shingrix), advised to verify with insurance if its covered through medical or prescription plan  Preventative screening  -advised to get colonoscopy for colon cancer screening -up to date  -will check PSA for prostate cancer screening  -labs drawn

## 2024-08-08 ENCOUNTER — CLINICAL ADVICE (OUTPATIENT)
Age: 59
End: 2024-08-08

## 2024-09-07 NOTE — HEALTH RISK ASSESSMENT
[No] : In the past 12 months have you used drugs other than those required for medical reasons? No Attending Attestation (For Attendings USE Only)...

## 2024-10-01 ENCOUNTER — APPOINTMENT (OUTPATIENT)
Dept: UROLOGY | Facility: CLINIC | Age: 59
End: 2024-10-01

## 2024-10-07 ENCOUNTER — APPOINTMENT (OUTPATIENT)
Dept: UROLOGY | Facility: CLINIC | Age: 59
End: 2024-10-07

## 2024-10-15 ENCOUNTER — APPOINTMENT (OUTPATIENT)
Dept: CARDIOLOGY | Facility: CLINIC | Age: 59
End: 2024-10-15
Payer: COMMERCIAL

## 2024-10-15 ENCOUNTER — NON-APPOINTMENT (OUTPATIENT)
Age: 59
End: 2024-10-15

## 2024-10-15 VITALS
OXYGEN SATURATION: 97 % | DIASTOLIC BLOOD PRESSURE: 78 MMHG | SYSTOLIC BLOOD PRESSURE: 123 MMHG | BODY MASS INDEX: 29.92 KG/M2 | HEART RATE: 67 BPM | WEIGHT: 191 LBS

## 2024-10-15 DIAGNOSIS — R93.1 ABNORMAL FINDINGS ON DIAGNOSTIC IMAGING OF HEART AND CORONARY CIRCULATION: ICD-10-CM

## 2024-10-15 DIAGNOSIS — Z96.653 PRESENCE OF ARTIFICIAL KNEE JOINT, BILATERAL: ICD-10-CM

## 2024-10-15 DIAGNOSIS — R94.31 ABNORMAL ELECTROCARDIOGRAM [ECG] [EKG]: ICD-10-CM

## 2024-10-15 DIAGNOSIS — I48.0 PAROXYSMAL ATRIAL FIBRILLATION: ICD-10-CM

## 2024-10-15 DIAGNOSIS — E78.5 HYPERLIPIDEMIA, UNSPECIFIED: ICD-10-CM

## 2024-10-15 PROCEDURE — G2211 COMPLEX E/M VISIT ADD ON: CPT | Mod: NC

## 2024-10-15 PROCEDURE — 93000 ELECTROCARDIOGRAM COMPLETE: CPT

## 2024-10-15 PROCEDURE — 99204 OFFICE O/P NEW MOD 45 MIN: CPT

## 2024-10-22 ENCOUNTER — APPOINTMENT (OUTPATIENT)
Dept: UROLOGY | Facility: CLINIC | Age: 59
End: 2024-10-22
Payer: COMMERCIAL

## 2024-10-22 DIAGNOSIS — N40.1 BENIGN PROSTATIC HYPERPLASIA WITH LOWER URINARY TRACT SYMPMS: ICD-10-CM

## 2024-10-22 DIAGNOSIS — R97.20 ELEVATED PROSTATE, SPECIFIC ANTIGEN [PSA]: ICD-10-CM

## 2024-10-22 DIAGNOSIS — R35.1 BENIGN PROSTATIC HYPERPLASIA WITH LOWER URINARY TRACT SYMPMS: ICD-10-CM

## 2024-10-22 DIAGNOSIS — R35.0 FREQUENCY OF MICTURITION: ICD-10-CM

## 2024-10-22 DIAGNOSIS — G47.33 OBSTRUCTIVE SLEEP APNEA (ADULT) (PEDIATRIC): ICD-10-CM

## 2024-10-22 PROCEDURE — 99214 OFFICE O/P EST MOD 30 MIN: CPT

## 2024-10-23 ENCOUNTER — APPOINTMENT (OUTPATIENT)
Dept: CARDIOLOGY | Facility: CLINIC | Age: 59
End: 2024-10-23

## 2024-11-18 ENCOUNTER — APPOINTMENT (OUTPATIENT)
Dept: CARDIOLOGY | Facility: CLINIC | Age: 59
End: 2024-11-18
Payer: COMMERCIAL

## 2024-11-18 PROCEDURE — 93351 STRESS TTE COMPLETE: CPT

## 2024-11-26 ENCOUNTER — APPOINTMENT (OUTPATIENT)
Dept: CARDIOLOGY | Facility: CLINIC | Age: 59
End: 2024-11-26
Payer: COMMERCIAL

## 2024-11-26 VITALS
DIASTOLIC BLOOD PRESSURE: 70 MMHG | HEART RATE: 72 BPM | BODY MASS INDEX: 30.07 KG/M2 | SYSTOLIC BLOOD PRESSURE: 120 MMHG | OXYGEN SATURATION: 97 % | WEIGHT: 192 LBS

## 2024-11-26 DIAGNOSIS — I48.0 PAROXYSMAL ATRIAL FIBRILLATION: ICD-10-CM

## 2024-11-26 DIAGNOSIS — E78.5 HYPERLIPIDEMIA, UNSPECIFIED: ICD-10-CM

## 2024-11-26 PROCEDURE — 99204 OFFICE O/P NEW MOD 45 MIN: CPT

## 2024-11-26 PROCEDURE — 99214 OFFICE O/P EST MOD 30 MIN: CPT

## 2024-11-26 PROCEDURE — G2211 COMPLEX E/M VISIT ADD ON: CPT | Mod: NC

## 2024-11-27 ENCOUNTER — RX RENEWAL (OUTPATIENT)
Age: 59
End: 2024-11-27

## 2024-12-19 RX ORDER — APIXABAN 5 MG/1
5 TABLET, FILM COATED ORAL
Qty: 180 | Refills: 2 | Status: ACTIVE | COMMUNITY
Start: 2024-12-19 | End: 1900-01-01

## 2025-01-17 ENCOUNTER — NON-APPOINTMENT (OUTPATIENT)
Age: 60
End: 2025-01-17

## 2025-01-17 ENCOUNTER — APPOINTMENT (OUTPATIENT)
Dept: ELECTROPHYSIOLOGY | Facility: CLINIC | Age: 60
End: 2025-01-17
Payer: COMMERCIAL

## 2025-01-17 VITALS
HEART RATE: 78 BPM | SYSTOLIC BLOOD PRESSURE: 112 MMHG | OXYGEN SATURATION: 98 % | BODY MASS INDEX: 30.23 KG/M2 | WEIGHT: 193 LBS | DIASTOLIC BLOOD PRESSURE: 74 MMHG

## 2025-01-17 DIAGNOSIS — I48.0 PAROXYSMAL ATRIAL FIBRILLATION: ICD-10-CM

## 2025-01-17 PROCEDURE — 93000 ELECTROCARDIOGRAM COMPLETE: CPT

## 2025-01-17 PROCEDURE — 99205 OFFICE O/P NEW HI 60 MIN: CPT | Mod: 25

## 2025-02-25 ENCOUNTER — RX RENEWAL (OUTPATIENT)
Age: 60
End: 2025-02-25

## 2025-03-10 ENCOUNTER — APPOINTMENT (OUTPATIENT)
Dept: INTERNAL MEDICINE | Facility: CLINIC | Age: 60
End: 2025-03-10
Payer: COMMERCIAL

## 2025-03-10 VITALS
DIASTOLIC BLOOD PRESSURE: 82 MMHG | WEIGHT: 195 LBS | HEART RATE: 71 BPM | SYSTOLIC BLOOD PRESSURE: 125 MMHG | OXYGEN SATURATION: 96 % | BODY MASS INDEX: 30.61 KG/M2 | HEIGHT: 67 IN

## 2025-03-10 DIAGNOSIS — R35.1 BENIGN PROSTATIC HYPERPLASIA WITH LOWER URINARY TRACT SYMPMS: ICD-10-CM

## 2025-03-10 DIAGNOSIS — M17.0 BILATERAL PRIMARY OSTEOARTHRITIS OF KNEE: ICD-10-CM

## 2025-03-10 DIAGNOSIS — R93.1 ABNORMAL FINDINGS ON DIAGNOSTIC IMAGING OF HEART AND CORONARY CIRCULATION: ICD-10-CM

## 2025-03-10 DIAGNOSIS — E78.5 HYPERLIPIDEMIA, UNSPECIFIED: ICD-10-CM

## 2025-03-10 DIAGNOSIS — I48.0 PAROXYSMAL ATRIAL FIBRILLATION: ICD-10-CM

## 2025-03-10 DIAGNOSIS — R22.0 LOCALIZED SWELLING, MASS AND LUMP, HEAD: ICD-10-CM

## 2025-03-10 DIAGNOSIS — N40.1 BENIGN PROSTATIC HYPERPLASIA WITH LOWER URINARY TRACT SYMPMS: ICD-10-CM

## 2025-03-10 DIAGNOSIS — Z00.00 ENCOUNTER FOR GENERAL ADULT MEDICAL EXAMINATION W/OUT ABNORMAL FINDINGS: ICD-10-CM

## 2025-03-10 DIAGNOSIS — G47.33 OBSTRUCTIVE SLEEP APNEA (ADULT) (PEDIATRIC): ICD-10-CM

## 2025-03-10 PROCEDURE — G0009: CPT

## 2025-03-10 PROCEDURE — 90677 PCV20 VACCINE IM: CPT

## 2025-03-10 PROCEDURE — 99396 PREV VISIT EST AGE 40-64: CPT | Mod: 25

## 2025-03-10 PROCEDURE — 36415 COLL VENOUS BLD VENIPUNCTURE: CPT

## 2025-03-11 LAB
ALBUMIN SERPL ELPH-MCNC: 4.5 G/DL
ALP BLD-CCNC: 52 U/L
ALT SERPL-CCNC: 14 U/L
ANION GAP SERPL CALC-SCNC: 12 MMOL/L
APPEARANCE: CLEAR
AST SERPL-CCNC: 17 U/L
BACTERIA: NEGATIVE /HPF
BASOPHILS # BLD AUTO: 0.03 K/UL
BASOPHILS NFR BLD AUTO: 0.4 %
BILIRUB DIRECT SERPL-MCNC: 0.2 MG/DL
BILIRUB INDIRECT SERPL-MCNC: 0.4 MG/DL
BILIRUB SERPL-MCNC: 0.5 MG/DL
BILIRUBIN URINE: NEGATIVE
BLOOD URINE: NEGATIVE
BUN SERPL-MCNC: 15 MG/DL
CALCIUM SERPL-MCNC: 9.1 MG/DL
CAST: 0 /LPF
CHLORIDE SERPL-SCNC: 105 MMOL/L
CHOLEST SERPL-MCNC: 139 MG/DL
CO2 SERPL-SCNC: 26 MMOL/L
COLOR: YELLOW
CREAT SERPL-MCNC: 0.91 MG/DL
EGFRCR SERPLBLD CKD-EPI 2021: 96 ML/MIN/1.73M2
EOSINOPHIL # BLD AUTO: 0.11 K/UL
EOSINOPHIL NFR BLD AUTO: 1.6 %
EPITHELIAL CELLS: 0 /HPF
ESTIMATED AVERAGE GLUCOSE: 114 MG/DL
FERRITIN SERPL-MCNC: 107 NG/ML
FOLATE SERPL-MCNC: 11.9 NG/ML
GLUCOSE QUALITATIVE U: NEGATIVE MG/DL
GLUCOSE SERPL-MCNC: 108 MG/DL
HBA1C MFR BLD HPLC: 5.6 %
HCT VFR BLD CALC: 44.9 %
HDLC SERPL-MCNC: 61 MG/DL
HGB BLD-MCNC: 14.4 G/DL
IMM GRANULOCYTES NFR BLD AUTO: 0.1 %
IRON SATN MFR SERPL: 28 %
IRON SERPL-MCNC: 75 UG/DL
KETONES URINE: NEGATIVE MG/DL
LDLC SERPL CALC-MCNC: 65 MG/DL
LEUKOCYTE ESTERASE URINE: NEGATIVE
LYMPHOCYTES # BLD AUTO: 2.58 K/UL
LYMPHOCYTES NFR BLD AUTO: 37 %
MAN DIFF?: NORMAL
MCHC RBC-ENTMCNC: 31.2 PG
MCHC RBC-ENTMCNC: 32.1 G/DL
MCV RBC AUTO: 97.2 FL
MICROSCOPIC-UA: NORMAL
MONOCYTES # BLD AUTO: 0.59 K/UL
MONOCYTES NFR BLD AUTO: 8.5 %
NEUTROPHILS # BLD AUTO: 3.65 K/UL
NEUTROPHILS NFR BLD AUTO: 52.4 %
NITRITE URINE: NEGATIVE
NONHDLC SERPL-MCNC: 78 MG/DL
PH URINE: 6
PLATELET # BLD AUTO: 217 K/UL
POTASSIUM SERPL-SCNC: 5.1 MMOL/L
PROT SERPL-MCNC: 6.8 G/DL
PROTEIN URINE: NEGATIVE MG/DL
PSA SERPL-MCNC: 5.21 NG/ML
RBC # BLD: 4.62 M/UL
RBC # FLD: 12.7 %
RED BLOOD CELLS URINE: 0 /HPF
SODIUM SERPL-SCNC: 142 MMOL/L
SPECIFIC GRAVITY URINE: 1.02
TIBC SERPL-MCNC: 274 UG/DL
TRIGL SERPL-MCNC: 63 MG/DL
TSH SERPL-ACNC: 1.61 UIU/ML
UIBC SERPL-MCNC: 198 UG/DL
UROBILINOGEN URINE: 0.2 MG/DL
VIT B12 SERPL-MCNC: 413 PG/ML
WBC # FLD AUTO: 6.97 K/UL
WHITE BLOOD CELLS URINE: 0 /HPF

## 2025-04-12 ENCOUNTER — APPOINTMENT (OUTPATIENT)
Dept: CT IMAGING | Facility: IMAGING CENTER | Age: 60
End: 2025-04-12
Payer: COMMERCIAL

## 2025-04-12 ENCOUNTER — OUTPATIENT (OUTPATIENT)
Dept: OUTPATIENT SERVICES | Facility: HOSPITAL | Age: 60
LOS: 1 days | End: 2025-04-12
Payer: COMMERCIAL

## 2025-04-12 DIAGNOSIS — Z98.890 OTHER SPECIFIED POSTPROCEDURAL STATES: Chronic | ICD-10-CM

## 2025-04-12 DIAGNOSIS — R22.0 LOCALIZED SWELLING, MASS AND LUMP, HEAD: ICD-10-CM

## 2025-04-12 DIAGNOSIS — Z96.60 PRESENCE OF UNSPECIFIED ORTHOPEDIC JOINT IMPLANT: Chronic | ICD-10-CM

## 2025-04-12 DIAGNOSIS — Z98.89 OTHER SPECIFIED POSTPROCEDURAL STATES: Chronic | ICD-10-CM

## 2025-04-12 PROCEDURE — 70487 CT MAXILLOFACIAL W/DYE: CPT | Mod: 26

## 2025-04-12 PROCEDURE — 70487 CT MAXILLOFACIAL W/DYE: CPT

## 2025-04-14 ENCOUNTER — CLINICAL ADVICE (OUTPATIENT)
Age: 60
End: 2025-04-14

## 2025-05-07 ENCOUNTER — APPOINTMENT (OUTPATIENT)
Dept: ORTHOPEDIC SURGERY | Facility: CLINIC | Age: 60
End: 2025-05-07
Payer: COMMERCIAL

## 2025-05-07 DIAGNOSIS — M25.559 PAIN IN UNSPECIFIED HIP: ICD-10-CM

## 2025-05-07 PROCEDURE — 73502 X-RAY EXAM HIP UNI 2-3 VIEWS: CPT | Mod: LT

## 2025-05-07 PROCEDURE — 73564 X-RAY EXAM KNEE 4 OR MORE: CPT | Mod: RT

## 2025-05-07 PROCEDURE — 73562 X-RAY EXAM OF KNEE 3: CPT | Mod: LT

## 2025-05-07 PROCEDURE — 99204 OFFICE O/P NEW MOD 45 MIN: CPT

## 2025-08-28 ENCOUNTER — RX RENEWAL (OUTPATIENT)
Age: 60
End: 2025-08-28

## 2025-09-17 ENCOUNTER — NON-APPOINTMENT (OUTPATIENT)
Age: 60
End: 2025-09-17

## 2025-09-17 ENCOUNTER — APPOINTMENT (OUTPATIENT)
Dept: CARDIOLOGY | Facility: CLINIC | Age: 60
End: 2025-09-17
Payer: COMMERCIAL

## 2025-09-17 VITALS
OXYGEN SATURATION: 97 % | WEIGHT: 187 LBS | BODY MASS INDEX: 29.29 KG/M2 | DIASTOLIC BLOOD PRESSURE: 76 MMHG | SYSTOLIC BLOOD PRESSURE: 110 MMHG | HEART RATE: 79 BPM

## 2025-09-17 DIAGNOSIS — G47.33 OBSTRUCTIVE SLEEP APNEA (ADULT) (PEDIATRIC): ICD-10-CM

## 2025-09-17 PROCEDURE — G2211 COMPLEX E/M VISIT ADD ON: CPT | Mod: NC

## 2025-09-17 PROCEDURE — 93000 ELECTROCARDIOGRAM COMPLETE: CPT

## 2025-09-17 PROCEDURE — 99204 OFFICE O/P NEW MOD 45 MIN: CPT

## 2025-09-24 PROBLEM — F41.8 SITUATIONAL ANXIETY: Status: ACTIVE | Noted: 2025-09-24
